# Patient Record
Sex: FEMALE | Race: WHITE | HISPANIC OR LATINO | Employment: FULL TIME | URBAN - METROPOLITAN AREA
[De-identification: names, ages, dates, MRNs, and addresses within clinical notes are randomized per-mention and may not be internally consistent; named-entity substitution may affect disease eponyms.]

---

## 2019-09-11 PROBLEM — F51.01 PRIMARY INSOMNIA: Status: ACTIVE | Noted: 2019-09-11

## 2022-08-12 ENCOUNTER — OFFICE VISIT (OUTPATIENT)
Dept: OBGYN CLINIC | Facility: CLINIC | Age: 32
End: 2022-08-12
Payer: COMMERCIAL

## 2022-08-12 VITALS
HEART RATE: 88 BPM | BODY MASS INDEX: 25.03 KG/M2 | WEIGHT: 136 LBS | HEIGHT: 62 IN | OXYGEN SATURATION: 98 % | SYSTOLIC BLOOD PRESSURE: 110 MMHG | DIASTOLIC BLOOD PRESSURE: 74 MMHG | TEMPERATURE: 99.6 F

## 2022-08-12 DIAGNOSIS — Z34.91 ENCOUNTER FOR SUPERVISION OF LOW-RISK PREGNANCY IN FIRST TRIMESTER: ICD-10-CM

## 2022-08-12 DIAGNOSIS — Z3A.01 LESS THAN 8 WEEKS GESTATION OF PREGNANCY: ICD-10-CM

## 2022-08-12 DIAGNOSIS — N91.2 AMENORRHEA: Primary | ICD-10-CM

## 2022-08-12 LAB — SL AMB POCT URINE HCG: POSITIVE

## 2022-08-12 PROCEDURE — 81025 URINE PREGNANCY TEST: CPT | Performed by: OBSTETRICS & GYNECOLOGY

## 2022-08-12 PROCEDURE — 99213 OFFICE O/P EST LOW 20 MIN: CPT | Performed by: OBSTETRICS & GYNECOLOGY

## 2022-08-12 PROCEDURE — 76817 TRANSVAGINAL US OBSTETRIC: CPT | Performed by: OBSTETRICS & GYNECOLOGY

## 2022-08-12 NOTE — PROGRESS NOTES
Subjective   Patient ID: Shannan Verma is a 36 y o  female  Patient is here for a problem visit  Chief Complaint   Patient presents with    Possible Pregnancy     New pt - positive home test - LMP 22; no concerns     New pt  Here with partner Drea Clinton   Sure LMP , 6+4 WGA   Regular cycles  Planned and desired pregnancy  Occasional mild cramping, no spotting  No N/V, some decreased appetite and breast soreness    Menstrual History:  OB History        1    Para   0    Term   0       0    AB   0    Living   0       SAB   0    IAB   0    Ectopic   0    Multiple   0    Live Births   0                  Patient's last menstrual period was 2022 (exact date)  History reviewed  No pertinent past medical history  Past Surgical History:   Procedure Laterality Date    NO PAST SURGERIES         Social History     Tobacco Use    Smoking status: Never Smoker    Smokeless tobacco: Never Used   Vaping Use    Vaping Use: Never used   Substance Use Topics    Alcohol use: Not Currently    Drug use: Never        No Known Allergies      Current Outpatient Medications:     Docosahexaenoic Acid (PRENATAL DHA PO), Take by mouth, Disp: , Rfl:     Prenatal Vit-Fe Fumarate-FA (PRENATAL VITAMINS PO), Take by mouth, Disp: , Rfl:     tacrolimus (PROTOPIC) 0 1 % ointment, Apply topically 2 (two) times a day, Disp: 100 g, Rfl: 1      Review of Systems   Constitutional: Negative for chills and fever  Eyes: Negative for visual disturbance  Respiratory: Negative for chest tightness and shortness of breath  Cardiovascular: Negative for chest pain  Gastrointestinal: Negative for abdominal pain, diarrhea, nausea and vomiting  Genitourinary: Negative for pelvic pain and vaginal bleeding  As noted in HPI   Skin: Negative for rash  Neurological: Negative for headaches  All other systems reviewed and are negative          /74 (BP Location: Left arm, Patient Position: Sitting, Cuff Size: Adult)   Pulse 88   Temp 99 6 °F (37 6 °C) (Tympanic)   Ht 5' 2" (1 575 m)   Wt 61 7 kg (136 lb)   LMP 06/27/2022 (Exact Date)   SpO2 98%   Breastfeeding No   BMI 24 87 kg/m²       Physical Exam  Constitutional:       General: She is not in acute distress  Appearance: Normal appearance  Genitourinary:      Right Labia: No rash, tenderness, lesions or skin changes  Left Labia: No tenderness, lesions, skin changes or rash  HENT:      Head: Normocephalic and atraumatic  Cardiovascular:      Rate and Rhythm: Normal rate  Pulmonary:      Effort: Pulmonary effort is normal  No respiratory distress  Abdominal:      General: There is no distension  Palpations: Abdomen is soft  Tenderness: There is no abdominal tenderness  There is no guarding or rebound  Neurological:      General: No focal deficit present  Mental Status: She is alert  Psychiatric:         Mood and Affect: Mood normal          Behavior: Behavior normal    Vitals and nursing note reviewed  US < 14 weeks     Gestational sac present  Yolk sac present  Fetal pole present  Fetal cardiac activity noted  FHR: 123 bpm     CRL: 3 6 mm, 6w0d  EDC by US 4/7/23  EDC by LMP 4/3/23      L ovary normal  R ovary normal    Ultrasound Probe Disinfection     A transvaginal ultrasound was performed  Prior to use, disinfection was performed with High Level Disinfection Process ([employer])  Probe serial number F: [id number] was used          Appropriate laboratory testing, imaging studies, and prior external records were reviewed:     Assessment/Plan:       Problem List Items Addressed This Visit        Other    Amenorrhea - Primary     SLIUP c/w stated LMP seen, OLIMPIA updated  MFM referral placed  Already taking PNV   F/u for OB intake in 3 weeks  OB precautions reviewed         Relevant Orders    POCT urine HCG (Completed)    Less than 8 weeks gestation of pregnancy      Other Visit Diagnoses Encounter for supervision of low-risk pregnancy in first trimester        Relevant Orders    Ambulatory Referral to Maternal Fetal Medicine

## 2022-08-12 NOTE — ASSESSMENT & PLAN NOTE
SLIUP c/w stated LMP seen, OLIMPIA updated  MFM referral placed  Already taking PNV   F/u for OB intake in 3 weeks  OB precautions reviewed

## 2022-08-31 ENCOUNTER — INITIAL PRENATAL (OUTPATIENT)
Dept: OBGYN CLINIC | Facility: CLINIC | Age: 32
End: 2022-08-31

## 2022-08-31 VITALS
OXYGEN SATURATION: 99 % | HEIGHT: 62 IN | DIASTOLIC BLOOD PRESSURE: 70 MMHG | TEMPERATURE: 99.2 F | WEIGHT: 136.8 LBS | HEART RATE: 87 BPM | BODY MASS INDEX: 25.17 KG/M2 | SYSTOLIC BLOOD PRESSURE: 112 MMHG

## 2022-08-31 DIAGNOSIS — Z3A.09 9 WEEKS GESTATION OF PREGNANCY: ICD-10-CM

## 2022-08-31 DIAGNOSIS — Z34.01 ENCOUNTER FOR SUPERVISION OF NORMAL FIRST PREGNANCY IN FIRST TRIMESTER: Primary | ICD-10-CM

## 2022-08-31 DIAGNOSIS — Z11.3 SCREEN FOR STD (SEXUALLY TRANSMITTED DISEASE): ICD-10-CM

## 2022-08-31 PROBLEM — Z34.00 SUPERVISION OF NORMAL FIRST PREGNANCY: Status: ACTIVE | Noted: 2022-08-31

## 2022-08-31 PROBLEM — N91.2 AMENORRHEA: Status: RESOLVED | Noted: 2022-08-12 | Resolved: 2022-08-31

## 2022-08-31 LAB
SL AMB  POCT GLUCOSE, UA: NEGATIVE
SL AMB POCT URINE PROTEIN: NEGATIVE

## 2022-08-31 PROCEDURE — PNV: Performed by: CLINICAL NURSE SPECIALIST

## 2022-08-31 PROCEDURE — 87491 CHLMYD TRACH DNA AMP PROBE: CPT | Performed by: CLINICAL NURSE SPECIALIST

## 2022-08-31 PROCEDURE — 87591 N.GONORRHOEAE DNA AMP PROB: CPT | Performed by: CLINICAL NURSE SPECIALIST

## 2022-08-31 PROCEDURE — OBC: Performed by: CLINICAL NURSE SPECIALIST

## 2022-08-31 NOTE — PROGRESS NOTES
Subjective  Patient ID: Fauzia Miles is a 36 y o  female here for OB intake  She is accompanied by: spouse    Pregnancy was planned  Reports the follow common c/o in early pregnancy: fatigue and Decreased appetite  Her Patient's last menstrual period was 2022 (exact date)  giving her an OLIMPIA of 23  She reports her menstrual cycle is regular    She has had a pregnancy confirmation appointment and previous US on 22 Demonstrated a viable IUP c/w GA by LMP    Obstetric history reviewed/updated:  OB History    Para Term  AB Living   1 0 0 0 0 0   SAB IAB Ectopic Multiple Live Births   0 0 0 0 0      # Outcome Date GA Lbr Paulie/2nd Weight Sex Delivery Anes PTL Lv   1 Current                Previous Pregnancy Complications/Hx: N/A- G1      The following portions of the patient's history were reviewed and updated as appropriate: allergies, current medications, past family history, past medical history, past social history, past surgical history, and problem list     Personal hx of thyroid disorder, DM/Pre-diabetes, PCOS, metformin use, CHTN: negative  Family hx of DM (T1 or T2), Pre-eclampsia/eclampsia, thyroid disorder:  positive for Thyroid dx and diabetes  Family genetic history completed: + FH     Pre-Pregnancy weight: Pregravid weight not on file  Pre-gravid BMI: Could not be calculated  Infection/Exposure Risk assessment  Employed: yes  Occupation: RN [employer]    Infection Screening    Prior hx of MRSA?  no  Recent COVID Infection or exposure:   no       Immunizations:   Vaccinated against Hep B: yes   Previous VZV vaccine or chicken pox disease   yes   influenza vaccine given this flu season: yes    Covid-19/SARS vaccine: yes   Discussed Tdap vaccine administration at 27-28 weeks    Substance and Domestic Abuse Screening  None             Depression screening     PHQ-A Depression Screening          ____________________________________________             LMP 2022 (Exact Date)   PE N/A-see other visit same day          Assessment/Plan:         OB intake completed  She is a  with Patient's last menstrual period was 2022 (exact date)  I reviewed risk factors for pregnancy based on her medical history     Diabetes risk Factors: The following hx was assessed r/t risk for diabetes in pregnancy: Pregestational DM, hx of GDM, BMI >35, 1st degree relative w/ T2 DM, personal hx of PCOS, Metformin use, Prior baby LGA/macrosomia  Pertinent positive:  + FH     Hypertension  The following hx was assessed r/t risk for HTN disorder in pregnancy:   (Risk level High) prior hx of CHTN, gHTN, Pre-eclampsia (or eclampsia or HELLP syndrome), FH  pre-eclampsia, Multifetal gestation, Type 1 or Type 2 DM, renal disease, Autoimmune disease, Nulliparity;   (Risk level Mod) BMI > 30, > age 28, > 10 yr pregnancy interval, Previous IUGR/SGA baby, race  Pertinent positive: Nulliparity      Additional risks/problems Identified:     Aneuploidy/Congenital defects risk factors: low  Discussed genetic screening/testing options- pt interested yes   For the low risk patient- counseled on cell free DNA vs sequential screening   SMA  no   Cystic Fibrosis Testing  no   Hemoglobin electrophoresis was given    Prenatal lab work reviewed  Reviewed routine US to be expected in pregnancy and [facility] NT/Level 2 Us/consult were ordered  Pt does not have [place] and thus Dinh Celis Was Not initiated  Pregnancy Education  [facility] Pregnancy Essentials Book reviewed and discussed  Call group and instructions to call the office/answering service in case of an emergency  Discussed appropriate weight gain in pregnancy based on pre-gravid BMI    Discussed healthy lifestyle choices for pregnancy     OB packet/Handouts given addressing   Nutrition, Immunizations, and Medications during pregnancy   Warning Signs During Pregnancy   Baby and Me phone chela guide and support center  [address] and Ultrasounds in Pregnancy    CoronaVirus and Zika precautions discussed and encouraged patient to visit [facility] website for up-to-date information  Warning signs reviewed as well as reasons to call  Problem List Items Addressed This Visit        pregnancy    Supervision of normal first pregnancy - Primary          No orders of the defined types were placed in this encounter

## 2022-08-31 NOTE — PATIENT INSTRUCTIONS
Again, congratulations on your pregnancy! NEXT STEPS  Get Prenatal bloodwork if not already done  Call [facility] to schedule next ultrasound (done 12-13 wks)   Contact information for [facility] ([facility]A Maternal Fetal Medicine)- Main Number [phone]   Return to our office in 4 weeks for routine prenatal visit (or sooner if any problems/concerns arise- see packet for things to report)  Check out [employer] website to read the "Pregnancy Essentials Guide"      It can be found by going to [employer]-->select services-->select women's health-->select Obstetrics  [email or web address]  ----------------------------------------------------  [address], [address] [facility]    Warning Signs During Pregnancy  The list below includes warning signs your providers would like you to be aware of  If you experience any of these at any time during your pregnancy, please call us as soon as possible  Vaginal bleeding   Sharp abdominal pain that does not go away   Fever (more than 100  4? F and is not relieved with Tylenol)   Persistent vomiting lasting greater than 24 hours   Chest pain/Shortness of breath   Pain or burning when you urinate    Call the OFFICE [phone] for any questions/emergencies  At night or on the weekend, calls go through a triage service, please indicate it is an emergency and the DOCTOR on call will be paged    ---------------------------------------------------------------    Discomforts of Early Pregnancy  Tips for coping with nausea and vomiting during pregnancy   Eat meals and snacks slowly   Eat every 1-2 hours to avoid a full stomach   Dont skip meals, avoid empty stomach   Eat a snack prior to getting out of bed   Avoid food and beverages with a strong aroma   Avoid dehydration - drink enough fluid to keep the urine pale yellow   Drink fluids before a meal to minimize the effect of a full stomach   Limit the amount of coffee and beverages that contain caffeine   Eliminate spicy, odorous, high fat (fried foods), acidic (tomato products) and sweet foods   Fluids that contain lemon (lemonade), mint (tea) or orange can usually be well tolerated   Snacks and meals that contain low-fat protein (lean meats, fish, poultry and eggs) along with eating easily digestible carbs (fruit, rice, toast, crackers and dry cereal) may be tolerated better   Foods with ginger may be well tolerated  May use ginger root powder, capsules or extract (up to 1000 mg per day)   Drink liquids in small amounts    If symptoms persist, please contact your provider  Tips for coping with constipation during pregnancy   Increase fiber and fluids   - Drink 8-10 cups of liquid, like water or low-sugar juice daily  - Keep urine pale with fluids (water, milk), fruit and vegetables   Eat a well-balanced diet that contains high fiber food (fruits, vegetables, whole grain breads and cereals, bran and dried beans)   Take a 30-minute walk daily   You may take a mild stool softener such as Colace®    If symptoms persist, please contact your provider  For any emergencies, PLEASE CALL THE OFFICE at [phone]  If the office is closed, the doctor on call will be paged by the answering service  Medications and Pregnancy- see handout in packetExpected Weight Gain During Pregnancy  If you have a healthy BMI (18-25) prior to pregnancy:  The recommended weight gain is between 25-35 pounds  Approximate weight gain  in the first trimester is 1-4 5 pounds  An expected weight gain during the second and  third trimester is approximately one pound per week  If you have a BMI of less than 18 prior to pregnancy,  you are considered underweight:  The recommended weight gain is between 28-40 pounds  Approximate weight gain  in the first trimester is 1-4 5 pounds   An expected weight gain during the second and  third trimester is just over one pound per week  If your BMI is 25 to 29 9: you are considered overweight:  You should gain 1/2 to 2/3 pound during the second and third trimester, for a total  weight gain of 15 to 25 pounds  If you have a BMI of greater than 30 prior to pregnancy,  you are considered overweight:  The recommended weight gain is between 15-25 pounds  Approximate weight gain  in the first trimester is 1-4 5 pounds  An expected weight gain during the second  and third trimester is approximately 0 5 pound per week  Foods to avoid during pregnancy:   Unpasteurized milk, juice and cheese  - Soft cheeses like feta or brie (if made with UNPASTEURIZED milk)   Unheated deli meats like lunchmeat and hotdogs   Undercooked poultry, beef, pork, seafood including raw sushi    What fish is safe to eat during pregnancy? Eat 8 to 12 ounces of fish a week  Pick from this group frequently, especially if you follow  the American Heart Associations recommendation to eat fish at least 2 times a week  AVOID HIGH MERCURY FISH  A single meal of the following fish can put  you over the Environmental Protection  Agencys safe limit for the month  High mercury fish:  Shark  Swordfish  [employer]  Tile Fish    Caffeine and Pregnancy  The [employer] and [employer] of Obstetrics and Gynecologists (ACOG) urge pregnant  women to limit their caffeine consumption to no more than 200 milligrams (mg) per day  This is  comparable to having one 12-ounce cup of coffee a day  This level has been shown not to increase risk  of miscarriage, growth or  labor complications  Effects of higher levels are not known  Exercise During Pregnancy  A daily exercise program that consists of 30 minutes a day is recommended     Low impact exercises like walking and swimming are great exercises throughout  all of pregnancy   If youre an avid strength  avoid lifting very heavy weights - nothing more  than 30 pounds    Drink plenty of fluids while exercising to stay hydrated  Be careful to avoid overheating  ACTIVITIES TO AVOID   Exercises that can make you lose your balance  Activities that can put your baby at risk i e  horseback riding, scuba diving, skiing  or snowboarding  Any other sport that puts you at risk for getting hit in the  abdominal area  Do not use saunas, steam rooms or hot tubs (that have a higher temperature  than 100F)   After the first trimester, avoid exercises that require you to lay flat on your back  Avoid exceeding a heart rate greater than 140 beats per minute  As long as you are  able to hold a conversation while exercising your heart rate is likely acceptable    Vaccines and Pregnancy    Information for pregnant women  Vaccines help protect you and your baby against serious diseases  Whooping Cough Vaccine  Whooping cough (or pertussis) can be serious for anyone, but for your , it can be lifethreatening  Up to 20 babies die each year in the [facility] due to whooping cough  When you get the whooping cough vaccine during your pregnancy, your body will create protective  antibodies and pass some of them to your baby before birth  These antibodies will provide your  baby some short-term, early protection against whooping cough  Learn more at www cdc gov/pertussis/pregnant/     Flu Vaccine  Changes in your immune, heart, and lung functions during pregnancy make you more likely to get  seriously ill from the flu  Catching the flu also increases your chances for serious problems for your  developing baby, including premature labor and delivery  Get the flu shot if you are pregnant during  flu season--its the best way to protect yourself and your baby for several months after birth from flu-related  complications  Flu seasons vary in their timing from season to season, but CDC recommends getting vaccinated  by the end of October, if possible   This timing helps protect you before flu activity begins to increase  Find more on how to prevent the flu by visiting www cdc gov/flu/     Covid Vaccine  Similar to the flu, Changes in your immune, heart, and lung functions during pregnancy make you more likely to get  seriously ill from COVID  It  also increases your chances for serious problems for your  developing baby, including premature labor and delivery  Please consider getting your covid vaccine if you haven't already  This is endorsed by both Delicia Arroyo of Obstetrics and Gynecology and Andrade Stevens of Maternal Fetal Medicine    Fetal Activity  You will most likely start to feel your baby move (also known as quickening) between  25 and 20 weeks of pregnancy  First time moms might feel their babys movements  closer to 25 weeks while a second time mom might feel those first movements closer  to 16 weeks

## 2022-08-31 NOTE — PROGRESS NOTES
Prenatal Visit  Subjective:   Malika Cardenas is a 36 y o  female  She is a  here for initial PN visit/New OB exam    She is reporting the following pregnancy related complaints:   No c/o other than fatigue   Denies N/V   Denies vaginal bleeding or cramping   Denies abnormal vaginal discharge or urinary c/o    Just completed OB intake today- see other visit same day  Past history review including family genetic history reveal the following risk factors:  1  Encounter for supervision of normal first pregnancy in first trimester    2  9 weeks gestation of pregnancy    3  Screen for STD (sexually transmitted disease)         Objective:  Patient's last menstrual period was 2022 (exact date)  /70 (BP Location: Left arm, Patient Position: Sitting, Cuff Size: Adult)   Pulse 87   Temp 99 2 °F (37 3 °C) (Tympanic)   Ht 5' 2" (1 575 m)   Wt 62 1 kg (136 lb 12 8 oz)   LMP 2022 (Exact Date)   SpO2 99%   BMI 25 02 kg/m²   Pregravid Weight/BMI: 61 2 kg (135 lb) (BMI 24 69)      OBGyn Exam- see prenatal physical form    FIRST TRIMESTER OBSTETRIC ULTRASOUND     Patient's last menstrual period was 2022 (exact date)  INDICATION: Follow up US     TECHNIQUE:   Transvaginal imaging was performed to assess the fetal cardiac activity     FINDINGS:  A single intrauterine gestation is identified  Gestational Sac: Present and is  normal appearing   Mean Crown-Rump Length:  20 5mm = 8w 5d  Cardiac activity is detected at 180  Adnexa:  No adnexal mass or pathologic cyst   Cul de Sac:  No significant free fluid identified     IMPRESSION:  Single intrauterine pregnancy of 8 weeks 5 days gestational age  Fetal cardiac activity detected  No adnexal masses seen  Good interval growth  No change in EDC    Ultrasound Probe Disinfection    A transvaginal ultrasound was performed  Prior to use, disinfection was performed with High Level Disinfection Process ([employer])    Probe serial number F: [id number] was used  Assessment & Plan:    1  Encounter for supervision of normal first pregnancy in first trimester  Assessment & Plan:    She is a  at [address] [facility] previously established  + FHR today by [address]  Good interval growth  New OB Exam completed  Pap is not indicated and gonorrhea/chlamydia cultures collected  See other A&P for risk factors/identified    I reviewed the expected course of the pregnancy with visits, labs and additional testing  The patient was given orders for prenatal labs today  Encouraged to complete within 2 wks  Genetic screening/testing options again reviewed and she elects for average risk NIPT     I have reviewed the maternal as well as infant benefits of breastfeeding with the patient  The patient currently plans to breastfeed, plans to bottle feed  I have reviewed proper nutrition in pregnancy as well as exercise and safe medications that can be used for various common symptoms in pregnancy  I reviewed the reasons to call in the first trimester - namely vaginal bleeding, severe nausea and vomiting, severe pelvic pain, fevers or pain/burning with urination  She was already previously referred to [facility] for NT US and will scheduled for level 2 G&A for 20-21 wks after that appt  I recommended that the patient receive the covid vaccine if not already done and to receive the flu vaccine during flu season-she usually gets for work  S/P covid vaccine x 2, plus booster  All questions were answered to her satisfaction  Orders:  -     POCT urine dip    2  9 weeks gestation of pregnancy    3  Screen for STD (sexually transmitted disease)  -     Chlamydia/GC amplified DNA by PCR;  Future  -     Chlamydia/GC amplified DNA by [address]  2022

## 2022-09-01 LAB
C TRACH DNA SPEC QL NAA+PROBE: NEGATIVE
N GONORRHOEA DNA SPEC QL NAA+PROBE: NEGATIVE

## 2022-09-01 NOTE — ASSESSMENT & PLAN NOTE
She is a  at [address] [facility] previously established  + FHR today by [address]  Good interval growth  New OB Exam completed  Pap is not indicated and gonorrhea/chlamydia cultures collected  See other A&P for risk factors/identified    I reviewed the expected course of the pregnancy with visits, labs and additional testing  The patient was given orders for prenatal labs today  Encouraged to complete within 2 wks  Genetic screening/testing options again reviewed and she elects for average risk NIPT     I have reviewed the maternal as well as infant benefits of breastfeeding with the patient  The patient currently plans to breastfeed, plans to bottle feed  I have reviewed proper nutrition in pregnancy as well as exercise and safe medications that can be used for various common symptoms in pregnancy  I reviewed the reasons to call in the first trimester - namely vaginal bleeding, severe nausea and vomiting, severe pelvic pain, fevers or pain/burning with urination  She was already previously referred to [facility] for NT US and will scheduled for level 2 G&A for 20-21 wks after that appt  I recommended that the patient receive the covid vaccine if not already done and to receive the flu vaccine during flu season-she usually gets for work  S/P covid vaccine x 2, plus booster  All questions were answered to her satisfaction

## 2022-09-24 ENCOUNTER — APPOINTMENT (OUTPATIENT)
Dept: LAB | Facility: MEDICAL CENTER | Age: 32
End: 2022-09-24
Payer: COMMERCIAL

## 2022-09-24 DIAGNOSIS — Z34.01 ENCOUNTER FOR SUPERVISION OF NORMAL FIRST PREGNANCY IN FIRST TRIMESTER: ICD-10-CM

## 2022-09-24 LAB
ABO GROUP BLD: NORMAL
BASOPHILS # BLD AUTO: 0.04 THOUSANDS/ΜL (ref 0–0.1)
BASOPHILS NFR BLD AUTO: 0 % (ref 0–1)
BILIRUB UR QL STRIP: NEGATIVE
BLD GP AB SCN SERPL QL: NEGATIVE
CLARITY UR: CLEAR
COLOR UR: COLORLESS
EOSINOPHIL # BLD AUTO: 0.04 THOUSAND/ΜL (ref 0–0.61)
EOSINOPHIL NFR BLD AUTO: 0 % (ref 0–6)
ERYTHROCYTE [DISTWIDTH] IN BLOOD BY AUTOMATED COUNT: 11.9 % (ref 11.6–15.1)
GLUCOSE UR STRIP-MCNC: NEGATIVE MG/DL
HBV SURFACE AG SER QL: NORMAL
HCT VFR BLD AUTO: 37.3 % (ref 34.8–46.1)
HCV AB SER QL: NORMAL
HGB BLD-MCNC: 12.7 G/DL (ref 11.5–15.4)
HGB UR QL STRIP.AUTO: NEGATIVE
IMM GRANULOCYTES # BLD AUTO: 0.15 THOUSAND/UL (ref 0–0.2)
IMM GRANULOCYTES NFR BLD AUTO: 2 % (ref 0–2)
KETONES UR STRIP-MCNC: NEGATIVE MG/DL
LEUKOCYTE ESTERASE UR QL STRIP: NEGATIVE
LYMPHOCYTES # BLD AUTO: 1.68 THOUSANDS/ΜL (ref 0.6–4.47)
LYMPHOCYTES NFR BLD AUTO: 17 % (ref 14–44)
MCH RBC QN AUTO: 31 PG (ref 26.8–34.3)
MCHC RBC AUTO-ENTMCNC: 34 G/DL (ref 31.4–37.4)
MCV RBC AUTO: 91 FL (ref 82–98)
MONOCYTES # BLD AUTO: 0.58 THOUSAND/ΜL (ref 0.17–1.22)
MONOCYTES NFR BLD AUTO: 6 % (ref 4–12)
NEUTROPHILS # BLD AUTO: 7.24 THOUSANDS/ΜL (ref 1.85–7.62)
NEUTS SEG NFR BLD AUTO: 75 % (ref 43–75)
NITRITE UR QL STRIP: NEGATIVE
NRBC BLD AUTO-RTO: 0 /100 WBCS
PH UR STRIP.AUTO: 6.5 [PH]
PLATELET # BLD AUTO: 219 THOUSANDS/UL (ref 149–390)
PMV BLD AUTO: 11.8 FL (ref 8.9–12.7)
PROT UR STRIP-MCNC: NEGATIVE MG/DL
RBC # BLD AUTO: 4.1 MILLION/UL (ref 3.81–5.12)
RH BLD: POSITIVE
RUBV IGG SERPL IA-ACNC: >175 IU/ML
SP GR UR STRIP.AUTO: 1.01 (ref 1–1.03)
SPECIMEN EXPIRATION DATE: NORMAL
TSH SERPL DL<=0.05 MIU/L-ACNC: 1.06 UIU/ML (ref 0.45–4.5)
UROBILINOGEN UR STRIP-ACNC: <2 MG/DL
WBC # BLD AUTO: 9.73 THOUSAND/UL (ref 4.31–10.16)

## 2022-09-24 PROCEDURE — 83020 HEMOGLOBIN ELECTROPHORESIS: CPT

## 2022-09-24 PROCEDURE — 36415 COLL VENOUS BLD VENIPUNCTURE: CPT

## 2022-09-24 PROCEDURE — 86803 HEPATITIS C AB TEST: CPT

## 2022-09-24 PROCEDURE — 84443 ASSAY THYROID STIM HORMONE: CPT

## 2022-09-24 PROCEDURE — 87086 URINE CULTURE/COLONY COUNT: CPT

## 2022-09-24 PROCEDURE — 80081 OBSTETRIC PANEL INC HIV TSTG: CPT

## 2022-09-24 PROCEDURE — 81003 URINALYSIS AUTO W/O SCOPE: CPT

## 2022-09-25 LAB
BACTERIA UR CULT: NORMAL
HIV 1+2 AB+HIV1 P24 AG SERPL QL IA: NORMAL
RPR SER QL: NORMAL

## 2022-09-26 ENCOUNTER — ROUTINE PRENATAL (OUTPATIENT)
Dept: PERINATAL CARE | Facility: OTHER | Age: 32
End: 2022-09-26
Payer: COMMERCIAL

## 2022-09-26 VITALS
DIASTOLIC BLOOD PRESSURE: 83 MMHG | HEIGHT: 62 IN | HEART RATE: 102 BPM | BODY MASS INDEX: 26.02 KG/M2 | WEIGHT: 141.4 LBS | SYSTOLIC BLOOD PRESSURE: 139 MMHG

## 2022-09-26 DIAGNOSIS — Z34.91 ENCOUNTER FOR SUPERVISION OF LOW-RISK PREGNANCY IN FIRST TRIMESTER: ICD-10-CM

## 2022-09-26 DIAGNOSIS — Z36.82 ENCOUNTER FOR (NT) NUCHAL TRANSLUCENCY SCAN: Primary | ICD-10-CM

## 2022-09-26 PROCEDURE — 36415 COLL VENOUS BLD VENIPUNCTURE: CPT | Performed by: OBSTETRICS & GYNECOLOGY

## 2022-09-26 PROCEDURE — 99242 OFF/OP CONSLTJ NEW/EST SF 20: CPT | Performed by: OBSTETRICS & GYNECOLOGY

## 2022-09-26 PROCEDURE — 76813 OB US NUCHAL MEAS 1 GEST: CPT | Performed by: OBSTETRICS & GYNECOLOGY

## 2022-09-26 NOTE — PATIENT INSTRUCTIONS
Thank you for choosing us for your  care today  If you have any questions about your ultrasound or care, please do not hesitate to contact us or your primary obstetrician  Some general instructions for your pregnancy are:    Protect against coronavirus: get vaccinated - pregnant women are increased risk of severe COVID  Notify your primary care doctor if you have any symptoms  Exercise: Aim for 22 minutes per day (150 minutes per week) of regular exercise  Walking is great! Nutrition: aim for calcium-rich and iron-rich foods as well as healthy sources of protein  Learn about Preeclampsia: preeclampsia is a common, serious high blood pressure complication in pregnancy  A blood pressure of 351YBEA (systolic or top number) or 45VWTP (diastolic or bottom number) is not normal and needs evaluation by your doctor  Aspirin is sometimes prescribed in early pregnancy to prevent preeclampsia in women with risk factors - ask your obstetrician if you should be on this medication  If you smoke, try to reduce how many cigarettes you smoke or try to quit completely  Do not vape  Other warning signs to watch out for in pregnancy or postpartum: chest pain, obstructed breathing or shortness of breath, seizures, thoughts of hurting yourself or your baby, bleeding, a painful or swollen leg, fever, or headache (see [employer]WHON[facility] POST-BIRTH Warning Signs campaign)  If these happen call 911  Itching is also not normal in pregnancy and if you experience this, especially over your hands and feet, potentially worse at night, notify your doctors

## 2022-09-26 NOTE — LETTER
September 26, 2022     Helen President, Hany Bennett [address] [facility],[address] [facility]  [address], [facility] [id number]    Patient: Jacqui Mcgee   YOB: 1990   Date of Visit: 9/26/2022       Dear Dr Randy Núñez: Thank you for referring Jacqui Mcgee to me for evaluation  Below are my notes for this consultation  If you have questions, please do not hesitate to call me  I look forward to following your patient along with you  Sincerely,        Marie Perry MD        CC: No Recipients  Marie Perry MD  9/26/2022  5:04 PM  Sign when Signing Visit  [facility] [employer] [facility]: Ms Sheri Porter was seen today for nuchal translucency ultrasound  See ultrasound report under "OB Procedures" tab  Review of Systems   Constitutional: Negative for chills, fever and unexpected weight change  HENT: Negative for congestion, dental problem, facial swelling and sore throat  Eyes: Negative for visual disturbance  Respiratory: Negative for cough and shortness of breath  Cardiovascular: Negative for chest pain and palpitations  Gastrointestinal: Negative for diarrhea and vomiting  Endocrine: Negative for polydipsia  Genitourinary: Negative for dysuria and vaginal bleeding  Musculoskeletal: Negative for back pain and joint swelling  Skin: Negative for rash and wound  Allergic/Immunologic: Negative for immunocompromised state  Neurological: Negative for seizures and headaches  Hematological: Does not bruise/bleed easily  Psychiatric/Behavioral: Negative for hallucinations and suicidal ideas  Physical Exam  Constitutional:       General: She is not in acute distress  Appearance: Normal appearance  She is not ill-appearing, toxic-appearing or diaphoretic  HENT:      Head: Normocephalic and atraumatic  Nose: No congestion or rhinorrhea  Eyes:      General: No scleral icterus  Right eye: No discharge  Left eye: No discharge        Extraocular Movements: Extraocular movements intact  Conjunctiva/sclera: Conjunctivae normal    Pulmonary:      Effort: Pulmonary effort is normal  No respiratory distress  Musculoskeletal:      Cervical back: Normal range of motion  Skin:     Coloration: Skin is not jaundiced or pale  Findings: No erythema, lesion or rash  Neurological:      General: No focal deficit present  Mental Status: She is alert and oriented to person, place, and time  Psychiatric:         Mood and Affect: Mood normal          Behavior: Behavior normal        The time spent on this new patient on the encounter date included 5 minutes previsit service time reviewing records and precharting, 10 minutes face-to-face service time counseling regarding results and coordinating care, and  5 minutes charting, totalling 20 minutes        Please don't hesitate to contact our office with any concerns or questions   -Juan Francisco Lizarraga MD

## 2022-09-26 NOTE — PROGRESS NOTES
Patient chose to have [facility] Non-invasive Prenatal Screen  Patient given brochure and is aware [facility] will contact patients insurance and coordinate coverage  Patient made aware she will need to respond to text message or e-mail from [employer] within 2 business days or testing will be run through insurance  Patient informed text message will come from area code  "415"  Provided [address] [place] [address] Street # [phone] and web site : [email or web address] [email or web address]     2 vials of blood drawn from right arm, patient tolerated blood draw without difficulty  Specimens labeled with patient identifiers (name, date of birth, specimen collection date), order and specimen was verified with patient, packed and sent via "[employer]  Copy of lab order scanned to Epic media  Maternal Fetal Medicine will have results in approximately 7-10 business days and will call patient or notify via [address]  Patient aware viewing lab result online will reveal fetal sex If ordered  Patient verbalized understanding of all instructions and no questions at this time

## 2022-09-26 NOTE — PROGRESS NOTES
[address] [employer] [address]: Ms Kira Colbert was seen today for nuchal translucency ultrasound  See ultrasound report under "OB Procedures" tab  Review of Systems   Constitutional: Negative for chills, fever and unexpected weight change  HENT: Negative for congestion, dental problem, facial swelling and sore throat  Eyes: Negative for visual disturbance  Respiratory: Negative for cough and shortness of breath  Cardiovascular: Negative for chest pain and palpitations  Gastrointestinal: Negative for diarrhea and vomiting  Endocrine: Negative for polydipsia  Genitourinary: Negative for dysuria and vaginal bleeding  Musculoskeletal: Negative for back pain and joint swelling  Skin: Negative for rash and wound  Allergic/Immunologic: Negative for immunocompromised state  Neurological: Negative for seizures and headaches  Hematological: Does not bruise/bleed easily  Psychiatric/Behavioral: Negative for hallucinations and suicidal ideas  Physical Exam  Constitutional:       General: She is not in acute distress  Appearance: Normal appearance  She is not ill-appearing, toxic-appearing or diaphoretic  HENT:      Head: Normocephalic and atraumatic  Nose: No congestion or rhinorrhea  Eyes:      General: No scleral icterus  Right eye: No discharge  Left eye: No discharge  Extraocular Movements: Extraocular movements intact  Conjunctiva/sclera: Conjunctivae normal    Pulmonary:      Effort: Pulmonary effort is normal  No respiratory distress  Musculoskeletal:      Cervical back: Normal range of motion  Skin:     Coloration: Skin is not jaundiced or pale  Findings: No erythema, lesion or rash  Neurological:      General: No focal deficit present  Mental Status: She is alert and oriented to person, place, and time     Psychiatric:         Mood and Affect: Mood normal          Behavior: Behavior normal        The time spent on this new patient on the encounter date included 5 minutes previsit service time reviewing records and precharting, 10 minutes face-to-face service time counseling regarding results and coordinating care, and  5 minutes charting, totalling 20 minutes        Please don't hesitate to contact our office with any concerns or questions   -Sami Keen MD

## 2022-09-27 ENCOUNTER — ROUTINE PRENATAL (OUTPATIENT)
Dept: OBGYN CLINIC | Facility: CLINIC | Age: 32
End: 2022-09-27

## 2022-09-27 VITALS
WEIGHT: 140 LBS | DIASTOLIC BLOOD PRESSURE: 64 MMHG | BODY MASS INDEX: 25.61 KG/M2 | SYSTOLIC BLOOD PRESSURE: 128 MMHG | HEART RATE: 92 BPM

## 2022-09-27 DIAGNOSIS — R51.9 PREGNANCY HEADACHE IN FIRST TRIMESTER: ICD-10-CM

## 2022-09-27 DIAGNOSIS — O26.891 PREGNANCY HEADACHE IN FIRST TRIMESTER: ICD-10-CM

## 2022-09-27 DIAGNOSIS — Z83.3 FAMILY HISTORY OF DIABETES MELLITUS: Primary | ICD-10-CM

## 2022-09-27 DIAGNOSIS — Z3A.13 13 WEEKS GESTATION OF PREGNANCY: ICD-10-CM

## 2022-09-27 DIAGNOSIS — Z34.01 ENCOUNTER FOR SUPERVISION OF NORMAL FIRST PREGNANCY IN FIRST TRIMESTER: ICD-10-CM

## 2022-09-27 PROBLEM — F51.01 PRIMARY INSOMNIA: Status: RESOLVED | Noted: 2019-09-11 | Resolved: 2022-09-27

## 2022-09-27 PROCEDURE — PNV: Performed by: OBSTETRICS & GYNECOLOGY

## 2022-09-27 RX ORDER — METOCLOPRAMIDE 10 MG/1
10 TABLET ORAL EVERY 6 HOURS PRN
Qty: 30 TABLET | Refills: 0 | Status: SHIPPED | OUTPATIENT
Start: 2022-09-27 | End: 2022-10-24 | Stop reason: ALTCHOICE

## 2022-09-27 NOTE — PROGRESS NOTES
S: 29 y o  Julia Irma 13w1d here for routine prenatal visit  Chief Complaint   Patient presents with    Routine Prenatal Visit     Getting headaches more frequently  Hydrating and eating enough in a day the patient states  OB complaints:  Contractions: no  Leakage: no  Bleeding: no  Fetal movement: no    More headaches in last few weeks, frontal, sometimes worse with hunger although will happen randomly, worse when feeling hot  Has taken tylenol   Denies h/o migraines prior to pregnancy       O:  /64 (BP Location: Right arm, Patient Position: Sitting, Cuff Size: Adult)   Pulse 92   Wt 63 5 kg (140 lb)   LMP 06/27/2022 (Exact Date)   BMI 25 61 kg/m²       Review of Systems   Constitutional: Negative for chills and fever  Eyes: Negative for visual disturbance  Respiratory: Negative for chest tightness and shortness of breath  Cardiovascular: Negative for chest pain  Gastrointestinal: Negative for abdominal pain, diarrhea, nausea and vomiting  Genitourinary: Negative for pelvic pain and vaginal bleeding  As noted in HPI   Skin: Negative for rash  Neurological: Positive for headaches  Negative for dizziness, seizures, syncope, weakness and numbness  All other systems reviewed and are negative  Physical Exam  Constitutional:       General: She is not in acute distress  Appearance: Normal appearance  HENT:      Head: Normocephalic and atraumatic  Cardiovascular:      Rate and Rhythm: Normal rate  Pulmonary:      Effort: Pulmonary effort is normal  No respiratory distress  Abdominal:      General: There is no distension  Palpations: Abdomen is soft  Tenderness: There is no abdominal tenderness  There is no guarding or rebound  Neurological:      General: No focal deficit present  Mental Status: She is alert  Psychiatric:         Mood and Affect: Mood normal          Behavior: Behavior normal    Vitals and nursing note reviewed  Fetal Heart Rate: 165    Pregravid Weight/BMI: 61 2 kg (135 lb) (BMI 24 69)  Current Weight: 63 5 kg (140 lb)   Total Weight Gain: 2 268 kg (5 lb)     Pre-Jostin Vitals    Flowsheet Row Most Recent Value   Prenatal Assessment    Fetal Heart Rate 165   Movement Absent   Prenatal Vitals    Blood Pressure 128/64   Weight - Scale 63 5 kg (140 lb)   Urine Albumin/Glucose    Dilation/Effacement/Station    Vaginal Drainage    Edema             Results for orders placed or performed in visit on 22   Urine culture    Specimen: Urine   Result Value Ref Range    Urine Culture No Growth <1000 cfu/mL    Hepatitis C antibody   Result Value Ref Range    Hepatitis C Ab Non-reactive Non-reactive   TSH, 3rd generation with Free T4 reflex   Result Value Ref Range    TSH 3RD GENERATON 1 060 0 450 - 4 500 uIU/mL   HIV 1/2 Antigen/Antibody (4th Generation) w Reflex [facility]   Result Value Ref Range    HIV-1/HIV-2 Ab Non-Reactive Non-Reactive   UA (URINE) with reflex to Scope   Result Value Ref Range    Color, UA Colorless     Clarity, UA Clear     Specific Gravity, UA 1 006 1 003 - 1 030    pH, UA 6 5 4 5, 5 0, 5 5, 6 0, 6 5, 7 0, 7 5, 8 0    Leukocytes, UA Negative Negative    Nitrite, UA Negative Negative    Protein, UA Negative Negative mg/dl    Glucose, UA Negative Negative mg/dl    Ketones, UA Negative Negative mg/dl    Urobilinogen, UA <2 0 <2 0 mg/dl mg/dl    Bilirubin, UA Negative Negative    Occult Blood, UA Negative Negative   Hepatitis B surface antigen   Result Value Ref Range    Hepatitis B Surface Ag Non-reactive Non-reactive, NonReactive - Confirmed   CBC and differential   Result Value Ref Range    WBC 9 73 4 31 - 10 16 Thousand/uL    RBC 4 10 3 81 - 5 12 Million/uL    Hemoglobin 12 7 11 5 - 15 4 g/dL    Hematocrit 37 3 34 8 - 46 1 %    MCV 91 82 - 98 fL    MCH 31 0 26 8 - 34 3 pg    MCHC 34 0 31 4 - 37 4 g/dL    RDW 11 9 11 6 - 15 1 %    MPV 11 8 8 9 - 12 7 fL    Platelets 580 756 - 020 Thousands/uL nRBC 0 /100 WBCs    Neutrophils Relative 75 43 - 75 %    Immat GRANS % 2 0 - 2 %    Lymphocytes Relative 17 14 - 44 %    Monocytes Relative 6 4 - 12 %    Eosinophils Relative 0 0 - 6 %    Basophils Relative 0 0 - 1 %    Neutrophils Absolute 7 24 1 85 - 7 62 Thousands/µL    Immature Grans Absolute 0 15 0 00 - 0 20 Thousand/uL    Lymphocytes Absolute 1 68 0 60 - 4 47 Thousands/µL    Monocytes Absolute 0 58 0 17 - 1 22 Thousand/µL    Eosinophils Absolute 0 04 0 00 - 0 61 Thousand/µL    Basophils Absolute 0 04 0 00 - 0 10 Thousands/µL   Rubella antibody, IgG   Result Value Ref Range    Rubella IgG Quant >175 0 >9 9 IU/mL   RPR   Result Value Ref Range    RPR Non-Reactive Non-Reactive   Type and screen   Result Value Ref Range    ABO Grouping O     Rh Factor Positive     Antibody Screen Negative     Specimen Expiration Date [id number]          Problem List        Other    13 weeks gestation of pregnancy    Supervision of normal first pregnancy    Overview     S/p COVID vaccine + booster   NIPT   - msAFP         Current Assessment & Plan     msAFP ordered, discussed timing  Reviewed normal NT US  Precautions reviewed         Family history of diabetes mellitus    Overview     Father, multiple aunts/uncles   Early glucola recommended -          Current Assessment & Plan     Discussed early diabetes screening per MFM recommendations given FH diabetes, glucola ordered         Pregnancy headache in first trimester    Current Assessment & Plan     No associated concerning neurologic sx, discussed use of tylenol, rx for reglan sent as needed                                  Helen President, MD  9/27/2022  3:27 PM

## 2022-09-27 NOTE — PATIENT INSTRUCTIONS
Lab test (AFP) for neural tube defect screening - to be done 16-18 weeks    [facility]: for up to date hours, call [phone]  [facility]/lab

## 2022-09-28 LAB
HGB A MFR BLD: 2.9 % (ref 1.8–3.2)
HGB A MFR BLD: 97.1 % (ref 96.4–98.8)
HGB F MFR BLD: 0 % (ref 0–2)
HGB FRACT BLD-IMP: NORMAL
HGB S MFR BLD: 0 %

## 2022-10-10 ENCOUNTER — TELEPHONE (OUTPATIENT)
Dept: PERINATAL CARE | Facility: OTHER | Age: 32
End: 2022-10-10

## 2022-10-10 NOTE — TELEPHONE ENCOUNTER
Spoke with patient and explained her results have been delayed and estimated report date in [employer] portal is 10/11/2022  We will call with any updates  Patient verbalized understanding

## 2022-10-24 ENCOUNTER — ROUTINE PRENATAL (OUTPATIENT)
Dept: OBGYN CLINIC | Facility: CLINIC | Age: 32
End: 2022-10-24

## 2022-10-24 VITALS
TEMPERATURE: 98.6 F | DIASTOLIC BLOOD PRESSURE: 68 MMHG | WEIGHT: 144 LBS | SYSTOLIC BLOOD PRESSURE: 118 MMHG | HEART RATE: 97 BPM | BODY MASS INDEX: 26.34 KG/M2

## 2022-10-24 DIAGNOSIS — Z34.02 ENCOUNTER FOR SUPERVISION OF NORMAL FIRST PREGNANCY IN SECOND TRIMESTER: ICD-10-CM

## 2022-10-24 DIAGNOSIS — R51.9 PREGNANCY HEADACHE IN SECOND TRIMESTER: ICD-10-CM

## 2022-10-24 DIAGNOSIS — O26.892 PREGNANCY HEADACHE IN SECOND TRIMESTER: ICD-10-CM

## 2022-10-24 DIAGNOSIS — Z83.3 FAMILY HISTORY OF DIABETES MELLITUS: ICD-10-CM

## 2022-10-24 DIAGNOSIS — Z3A.17 17 WEEKS GESTATION OF PREGNANCY: Primary | ICD-10-CM

## 2022-10-24 LAB
SL AMB  POCT GLUCOSE, UA: NEGATIVE
SL AMB POCT URINE PROTEIN: NEGATIVE

## 2022-10-24 PROCEDURE — PNV: Performed by: OBSTETRICS & GYNECOLOGY

## 2022-10-24 RX ORDER — ACETAMINOPHEN 500 MG
500 TABLET ORAL EVERY 6 HOURS PRN
COMMUNITY

## 2022-10-24 NOTE — PROGRESS NOTES
S: 29 y o  Vania Jenkins 17w0d here for routine prenatal visit  Chief Complaint   Patient presents with   • Routine Prenatal Visit     Patient reports no concerns  OB complaints:  Contractions: no  Leakage: no  Bleeding: no  Fetal movement: yes, flutters      O:  /68 (BP Location: Right arm, Cuff Size: Standard)   Pulse 97   Temp 98 6 °F (37 °C) (Tympanic)   Wt 65 3 kg (144 lb)   LMP 2022 (Exact Date)   BMI 26 34 kg/m²       Review of Systems   Constitutional: Negative for chills and fever  Eyes: Negative for visual disturbance  Respiratory: Negative for chest tightness and shortness of breath  Cardiovascular: Negative for chest pain  Gastrointestinal: Negative for abdominal pain, diarrhea, nausea and vomiting  Genitourinary: Negative for pelvic pain and vaginal bleeding  As noted in HPI   Skin: Negative for rash  Neurological: Negative for headaches  All other systems reviewed and are negative  Physical Exam  Constitutional:       General: She is not in acute distress  Appearance: Normal appearance  HENT:      Head: Normocephalic and atraumatic  Cardiovascular:      Rate and Rhythm: Normal rate  Pulmonary:      Effort: Pulmonary effort is normal  No respiratory distress  Abdominal:      General: There is no distension  Palpations: Abdomen is soft  Tenderness: There is no abdominal tenderness  There is no guarding or rebound  Comments: gravid   Neurological:      General: No focal deficit present  Mental Status: She is alert  Psychiatric:         Mood and Affect: Mood normal          Behavior: Behavior normal    Vitals and nursing note reviewed                Fetal Heart Rate: 155    Pregravid Weight/BMI: 61 2 kg (135 lb) (BMI 24 69)  Current Weight: 65 3 kg (144 lb)   Total Weight Gain: 4 082 kg (9 lb)     Pre- Vitals    Flowsheet Row Most Recent Value   Prenatal Assessment    Fetal Heart Rate 155   Movement Present Prenatal Vitals    Blood Pressure 118/68   Weight - Scale 65 3 kg (144 lb)   Urine Albumin/Glucose    Dilation/Effacement/Station    Vaginal Drainage    Edema                   Problem List        Other    17 weeks gestation of pregnancy    Encounter for supervision of normal first pregnancy in second trimester    Overview     S/p COVID vaccine + booster   NIPT low risk   - msAFP         Current Assessment & Plan     Reviewed msAFP timing which had previously been ordered  Anatomy US is scheduled   [facility]   OB precautions reviewed         Family history of diabetes mellitus    Overview     Father, multiple aunts/uncles   Early glucola recommended -          Current Assessment & Plan     Reviewed indication for early diabetes screening, glucola previously ordered         Pregnancy headache in second trimester    Current Assessment & Plan     Has made adjustments to her diet and sleep which has helped with headaches, gets infrequently now, not requiring robert Noriegala  10/24/2022  3:15 PM

## 2022-10-24 NOTE — ASSESSMENT & PLAN NOTE
Reviewed msAFP timing which had previously been ordered  Anatomy US is scheduled   She is scheduled to get flu vaccine through [facility] next week   OB precautions reviewed

## 2022-10-24 NOTE — ASSESSMENT & PLAN NOTE
Has made adjustments to her diet and sleep which has helped with headaches, gets infrequently now, not requiring reglan

## 2022-11-04 ENCOUNTER — APPOINTMENT (OUTPATIENT)
Dept: LAB | Facility: MEDICAL CENTER | Age: 32
End: 2022-11-04

## 2022-11-04 DIAGNOSIS — Z83.3 FAMILY HISTORY OF DIABETES MELLITUS: Primary | ICD-10-CM

## 2022-11-04 DIAGNOSIS — O99.810 GLUCOSE INTOLERANCE OF PREGNANCY: ICD-10-CM

## 2022-11-04 DIAGNOSIS — Z34.01 ENCOUNTER FOR SUPERVISION OF NORMAL FIRST PREGNANCY IN FIRST TRIMESTER: ICD-10-CM

## 2022-11-04 DIAGNOSIS — Z83.3 FAMILY HISTORY OF DIABETES MELLITUS: ICD-10-CM

## 2022-11-04 DIAGNOSIS — O99.810 GLUCOSE INTOLERANCE OF PREGNANCY: Primary | ICD-10-CM

## 2022-11-04 PROBLEM — O24.419 GESTATIONAL DIABETES: Status: ACTIVE | Noted: 2022-11-04

## 2022-11-04 LAB — GLUCOSE 1H P 50 G GLC PO SERPL-MCNC: 215 MG/DL (ref 40–134)

## 2022-11-04 NOTE — PROGRESS NOTES
Called and spoke with pt about results  Discussed given glucola > 200 would recommend proceeding with diabetic education and tx for presumptive early GDM  Pt is amenable to having diabetic education consult but also wants to have 3 hr GTT done   Discussed we can do this although it may not , pt voiced understanding, order placed and instructions reviewed

## 2022-11-05 LAB
2ND TRIMESTER 4 SCREEN SERPL-IMP: NORMAL
AFP ADJ MOM SERPL: 1.35
AFP INTERP AMN-IMP: NORMAL
AFP INTERP SERPL-IMP: NORMAL
AFP INTERP SERPL-IMP: NORMAL
AFP SERPL-MCNC: 69.3 NG/ML
AGE AT DELIVERY: 30.5 YR
GA METHOD: NORMAL
GA: 18.6 WEEKS
IDDM PATIENT QL: NO
MULTIPLE PREGNANCY: NO
NEURAL TUBE DEFECT RISK FETUS: 4151 %

## 2022-11-12 ENCOUNTER — APPOINTMENT (OUTPATIENT)
Dept: LAB | Facility: HOSPITAL | Age: 32
End: 2022-11-12

## 2022-11-12 DIAGNOSIS — O99.810 GLUCOSE INTOLERANCE OF PREGNANCY: ICD-10-CM

## 2022-11-12 DIAGNOSIS — Z83.3 FAMILY HISTORY OF DIABETES MELLITUS: ICD-10-CM

## 2022-11-12 LAB
EST. AVERAGE GLUCOSE BLD GHB EST-MCNC: 100 MG/DL
GLUCOSE 1H P 100 G GLC PO SERPL-MCNC: 270 MG/DL (ref 65–179)
GLUCOSE 2H P 100 G GLC PO SERPL-MCNC: 217 MG/DL (ref 65–154)
GLUCOSE 3H P 100 G GLC PO SERPL-MCNC: 90 MG/DL (ref 65–139)
GLUCOSE P FAST SERPL-MCNC: 93 MG/DL (ref 65–94)
HBA1C MFR BLD: 5.1 %

## 2022-11-14 ENCOUNTER — TELEPHONE (OUTPATIENT)
Dept: OBGYN CLINIC | Facility: CLINIC | Age: 32
End: 2022-11-14

## 2022-11-14 NOTE — TELEPHONE ENCOUNTER
The patient called and asked if you could call her as she has more questions for you  Please advise

## 2022-11-14 NOTE — TELEPHONE ENCOUNTER
Called and spoke with pt, answered questions regarding 3 hr GTT values   She already has diabetic education consult scheduled for 12/1 as this was the soonest she could do with her work schedule

## 2022-11-17 ENCOUNTER — ROUTINE PRENATAL (OUTPATIENT)
Dept: PERINATAL CARE | Facility: OTHER | Age: 32
End: 2022-11-17

## 2022-11-17 VITALS
HEIGHT: 62 IN | WEIGHT: 144.4 LBS | DIASTOLIC BLOOD PRESSURE: 64 MMHG | BODY MASS INDEX: 26.57 KG/M2 | HEART RATE: 94 BPM | SYSTOLIC BLOOD PRESSURE: 122 MMHG

## 2022-11-17 DIAGNOSIS — Z3A.20 20 WEEKS GESTATION OF PREGNANCY: ICD-10-CM

## 2022-11-17 DIAGNOSIS — O35.EXX0 PYELECTASIS OF FETUS ON PRENATAL ULTRASOUND: Primary | ICD-10-CM

## 2022-11-17 DIAGNOSIS — Z36.89 ENCOUNTER FOR FETAL ANATOMIC SURVEY: ICD-10-CM

## 2022-11-17 DIAGNOSIS — Z36.86 ENCOUNTER FOR ANTENATAL SCREENING FOR CERVICAL LENGTH: ICD-10-CM

## 2022-11-17 DIAGNOSIS — O24.410 DIET CONTROLLED GESTATIONAL DIABETES MELLITUS (GDM) IN SECOND TRIMESTER: ICD-10-CM

## 2022-11-17 NOTE — LETTER
November 17, 2022     MD Gabe Degroot [address]  [address] [facility]    Patient: Claire Ramirez   YOB: 1990   Date of Visit: 11/17/2022       Dear Dr Jamshid Hernandez: Thank you for referring Claire Ramirez to me for evaluation  Below are my notes for this consultation  If you have questions, please do not hesitate to call me  I look forward to following your patient along with you  Sincerely,        Fatmata Schwarz MD        CC: No Recipients  Fatmata Schwarz MD  11/17/2022  3:38 PM  Sign when Signing Visit  [facility]: Ms Babatunde Woods was seen today at 20w3d for anatomic survey and cervical length screening ultrasound  See ultrasound report under "OB Procedures" tab    Please don't hesitate to contact our office with any concerns or questions   -Fatmata Schwarz MD

## 2022-11-17 NOTE — PROGRESS NOTES
Ultrasound Probe Disinfection    A transvaginal ultrasound was performed  Prior to use, disinfection was performed with High Level Disinfection Process (Trophon)  Probe serial number S1: [id number] was used        Miriam Oneil  11/17/22  2:40 PM

## 2022-11-17 NOTE — PROGRESS NOTES
Jordy Montiel: Ms Mateus Deshpande was seen today at 20w3d for anatomic survey and cervical length screening ultrasound  See ultrasound report under "OB Procedures" tab    Please don't hesitate to contact our office with any concerns or questions   -Yue Cervantes MD

## 2022-11-21 PROBLEM — Z3A.21 21 WEEKS GESTATION OF PREGNANCY: Status: ACTIVE | Noted: 2022-08-12

## 2022-11-22 ENCOUNTER — ROUTINE PRENATAL (OUTPATIENT)
Dept: OBGYN CLINIC | Facility: CLINIC | Age: 32
End: 2022-11-22

## 2022-11-22 VITALS
BODY MASS INDEX: 26.52 KG/M2 | HEART RATE: 84 BPM | WEIGHT: 145 LBS | SYSTOLIC BLOOD PRESSURE: 124 MMHG | TEMPERATURE: 98.5 F | DIASTOLIC BLOOD PRESSURE: 78 MMHG

## 2022-11-22 DIAGNOSIS — O26.892 PREGNANCY HEADACHE IN SECOND TRIMESTER: ICD-10-CM

## 2022-11-22 DIAGNOSIS — Z34.92 SECOND TRIMESTER PREGNANCY: Primary | ICD-10-CM

## 2022-11-22 DIAGNOSIS — Z3A.21 21 WEEKS GESTATION OF PREGNANCY: ICD-10-CM

## 2022-11-22 DIAGNOSIS — O35.EXX0 PYELECTASIS OF FETUS ON PRENATAL ULTRASOUND: ICD-10-CM

## 2022-11-22 DIAGNOSIS — Z34.02 ENCOUNTER FOR SUPERVISION OF NORMAL FIRST PREGNANCY IN SECOND TRIMESTER: ICD-10-CM

## 2022-11-22 DIAGNOSIS — O24.410 DIET CONTROLLED GESTATIONAL DIABETES MELLITUS (GDM) IN SECOND TRIMESTER: ICD-10-CM

## 2022-11-22 DIAGNOSIS — R51.9 PREGNANCY HEADACHE IN SECOND TRIMESTER: ICD-10-CM

## 2022-11-22 LAB
SL AMB  POCT GLUCOSE, UA: NEGATIVE
SL AMB POCT URINE PROTEIN: NEGATIVE

## 2022-11-22 NOTE — PROGRESS NOTES
OB/GYN  PN Visit  Fernanda Back  [id number]  2022  5:33 PM  Dr Wille Galeazzi, MD    S: 29 y o  Voncille Press 21w1d here for PN visit  Chief Complaint   Patient presents with   • Routine Prenatal Visit     Wants to discuss further recent GDM          OB complaints:  Contractions: no  Leakage: no  Bleeding: no  Fetal movement: yes      O:  /78 (BP Location: Right arm, Cuff Size: Standard)   Pulse 84   Temp 98 5 °F (36 9 °C) (Tympanic)   Wt 65 8 kg (145 lb)   LMP 2022 (Exact Date)   BMI 26 52 kg/m²       Review of Systems   Constitutional: Negative  HENT: Negative  Eyes: Negative  Respiratory: Negative  Cardiovascular: Negative  Gastrointestinal: Negative  Endocrine: Negative  Genitourinary:        As noted in HPI   Musculoskeletal: Negative  Skin: Negative  Allergic/Immunologic: Negative  Neurological: Negative  Hematological: Negative  Psychiatric/Behavioral: Negative  Physical Exam  Constitutional:       General: She is not in acute distress  Appearance: She is well-developed and well-nourished  Abdominal:      Palpations: Abdomen is soft  Tenderness: There is no abdominal tenderness  There is no guarding  Neurological:      Mental Status: She is alert and oriented to person, place, and time  Skin:     General: Skin is warm and dry     Psychiatric:         Mood and Affect: Mood and affect normal          Behavior: Behavior normal              Pregravid Weight/BMI: 61 2 kg (135 lb) (BMI 24 69)  Current Weight: 65 8 kg (145 lb)   Total Weight Gain: 4 536 kg (10 lb)   Pre- Vitals    Flowsheet Row Most Recent Value   Prenatal Assessment    Fetal Heart Rate 159   Movement Present   Prenatal Vitals    Blood Pressure 124/78   Weight - Scale 65 8 kg (145 lb)   Urine Albumin/Glucose    Dilation/Effacement/Station    Vaginal Drainage    Edema          TW-35 lbs for pregnancy based on BMI    Problem List        Endocrine    Diet controlled gestational diabetes mellitus (GDM) in second trimester    Overview     glucola at 18 weeks elevated > 200  Diabetic education referral scheduled   Pt desires to complete 3 hr GTT regardless - abnormal, 2/4 elevated            Other    21 weeks gestation of pregnancy    Encounter for supervision of normal first pregnancy in second trimester    Overview     S/p COVID vaccine + booster   NIPT low risk   - msAFP         Family history of diabetes mellitus    Overview     Father, multiple aunts/uncles   Early glucola recommended - elevated > 200         Pregnancy headache in second trimester    Pyelectasis of fetus on prenatal ultrasound    Overview     UTDA1 bilaterally at anatomy ultrasound               Future Appointments   Date Time Provider Luis Kaba   2022  1:00 PM Veronica Monterroso, Yumi [facility]   2023  8:00 AM   [facility]     Meeting with dietician next week  Family h o diabetes  Diagnosed with GDM  Discussed GDM in pregnancy  Received flu vaccine last week  Follow-up in 4 weeks    CBC and rp AT 29 WEEKS      Es Kingston MD  2022  5:33 PM

## 2022-11-22 NOTE — PATIENT INSTRUCTIONS
Pregnancy at 23 to [address]:   Now that you are in your second trimester, you have more energy  You may also be feeling hungrier than usual  You may be gaining about ½ to 1 pound a week, and your pregnancy is beginning to show  You may need to start wearing maternity clothes  As your baby gets larger, you may have other symptoms  These may include body aches or stretch marks on your abdomen, breasts, thighs, or buttocks  DISCHARGE INSTRUCTIONS:   Return to the emergency department if:   You develop a severe headache that does not go away  You have new or increased vision changes, such as blurred or spotted vision  You have new or increased swelling in your face or hands  You have vaginal spotting or bleeding  Your water broke or you feel warm water gushing or trickling from your vagina  Call your doctor or obstetrician if:   You have abdominal cramps, pressure, or tightening  You have a change in vaginal discharge  You cannot keep food or drinks down, and you are losing weight  You have chills or a fever  You have vaginal itching, burning, or pain  You have yellow, green, white, or foul-smelling vaginal discharge  You have pain or burning when you urinate, less urine than usual, or pink or bloody urine  You have questions or concerns about your condition or care  How to care for yourself at this stage of your pregnancy:       Eat a variety of healthy foods  Healthy foods include fruits, vegetables, whole-grain breads, low-fat dairy foods, beans, lean meats, and fish  Drink liquids as directed  Ask how much liquid to drink each day and which liquids are best for you  Limit caffeine to less than 200 milligrams each day  Limit your intake of fish to 2 servings each week  Choose fish low in mercury such as canned light tuna, shrimp, salmon, cod, or tilapia  Do not  eat fish high in mercury such as swordfish, tilefish, chris mackerel, and shark           Take prenatal vitamins as directed  Your need for certain vitamins and minerals, such as folic acid, increases during pregnancy  Prenatal vitamins provide some of the extra vitamins and minerals you need  Prenatal vitamins may also help to decrease the risk of certain birth defects  Talk to your healthcare provider about exercise  Moderate exercise can help you stay fit  Your healthcare provider will help you plan an exercise program that is safe for you during pregnancy  Do not smoke  Smoking increases your risk of a miscarriage and other health problems during your pregnancy  Smoking can cause your baby to be born too early or weigh less at birth  Ask your healthcare provider for information if you need help quitting  Do not drink alcohol  Alcohol passes from your body to your baby through the placenta  It can affect your baby's brain development and cause fetal alcohol syndrome (FAS)  FAS is a group of conditions that causes mental, behavior, and growth problems  Talk to your healthcare provider before you take any medicines  Many medicines may harm your baby if you take them when you are pregnant  Do not take any medicines, vitamins, herbs, or supplements without first talking to your healthcare provider  Never use illegal or street drugs (such as marijuana or cocaine) while you are pregnant  Safety tips during pregnancy:   Avoid hot tubs and saunas  Do not use a hot tub or sauna while you are pregnant, especially during your first trimester  Hot tubs and saunas may raise your baby's temperature and increase the risk of birth defects  Avoid toxoplasmosis  This is an infection caused by eating raw meat or being around infected cat feces  It can cause birth defects, miscarriages, and other problems  Wash your hands after you touch raw meat  Make sure any meat is well-cooked before you eat it  Avoid raw eggs and unpasteurized milk   Use gloves or ask someone else to clean your cat's litter box while you are pregnant  Changes happening with your baby:  By 22 weeks, your baby is about 8 inches long from the top of the head to the rump (baby's bottom)  Your baby also weighs about 1 pound  Your baby is becoming much more active  You may be able to feel the baby move inside you now  The first movements may not be that noticeable  They may feel like a fluttering sensation  As time goes on, your baby's movements will become stronger and more noticeable  What you need to know about prenatal care:  During the first 28 weeks of your pregnancy, you will see your healthcare provider once a month  Your healthcare provider will check your blood pressure and weight  You may also need the following:  A urine test  may also be done to check for sugar and protein  These can be signs of gestational diabetes or infection  Protein in your urine may also be a sign of preeclampsia  Preeclampsia is a condition that can develop during week 20 or later of your pregnancy  It causes high blood pressure, and it can cause problems with your kidneys and other organs  Fundal height  is a measurement of your uterus to check your baby's growth  This number is usually the same as the number of weeks that you have been pregnant  A fetal ultrasound  shows pictures of your baby inside your uterus  It shows your baby's development  The movement and position of your baby can also be seen  Your healthcare provider may be able to tell you what your baby's gender is during the ultrasound  Your baby's heart rate  will be checked  Follow up with your obstetrician as directed:  Write down your questions so you remember to ask them during your visits  © [employer] 2022 Information is for End User's use only and may not be sold, redistributed or otherwise used for commercial purposes   All illustrations and images included in CareNotes® are the copyrighted property of A D A M , Inc  or [employer]  The above information is an  only  It is not intended as medical advice for individual conditions or treatments  Talk to your doctor, nurse or pharmacist before following any medical regimen to see if it is safe and effective for you

## 2022-12-01 ENCOUNTER — TELEMEDICINE (OUTPATIENT)
Dept: PERINATAL CARE | Facility: CLINIC | Age: 32
End: 2022-12-01

## 2022-12-01 ENCOUNTER — TELEPHONE (OUTPATIENT)
Dept: PERINATAL CARE | Facility: CLINIC | Age: 32
End: 2022-12-01

## 2022-12-01 DIAGNOSIS — O24.419 GESTATIONAL DIABETES MELLITUS (GDM) IN SECOND TRIMESTER, GESTATIONAL DIABETES METHOD OF CONTROL UNSPECIFIED: Primary | ICD-10-CM

## 2022-12-01 DIAGNOSIS — Z83.3 FAMILY HISTORY OF DIABETES MELLITUS: ICD-10-CM

## 2022-12-01 DIAGNOSIS — Z3A.22 22 WEEKS GESTATION OF PREGNANCY: ICD-10-CM

## 2022-12-01 DIAGNOSIS — O99.810 GLUCOSE INTOLERANCE OF PREGNANCY: ICD-10-CM

## 2022-12-01 RX ORDER — LANCETS
EACH MISCELLANEOUS
Qty: 100 EACH | Refills: 7 | Status: SHIPPED | OUTPATIENT
Start: 2022-12-01 | End: 2023-04-03

## 2022-12-01 RX ORDER — PERPHENAZINE 16 MG/1
TABLET, FILM COATED ORAL
Qty: 100 STRIP | Refills: 7 | Status: SHIPPED | OUTPATIENT
Start: 2022-12-01 | End: 2023-04-03

## 2022-12-01 RX ORDER — BLOOD-GLUCOSE METER
EACH MISCELLANEOUS
Qty: 1 KIT | Refills: 0 | Status: SHIPPED | OUTPATIENT
Start: 2022-12-01

## 2022-12-01 NOTE — PATIENT INSTRUCTIONS
CLASS 1  Diabetes in Pregnancy Program   [facility] Maternal Fetal Medicine    Diabetes Team:   Yuan Zabala (Pat) Ofelia Gill (Theresa Dela Cruz) MILLA Chase [place] Rushie Los, RN    Our medical assistant, Gissell Hidalgo can be reached at [phone] Monday thru Friday 7:45 am - 4:15 pm      We look forward to helping you manage your gestational diabetes during pregnancy  CHECKING BLOOD SUGARS    CHECKING BLOOD SUGARS:  Always carry your meter and testing supplies with you at all times  Bring you glucose meter to all your appointments in our [facility] office  **A request for a glucose meter, test strips and lancets was sent to the provider for approval  Once your prescriptions are approved by the provider, your prescription will be sent electronically to your pharmacy  Be mindful the provider is seeing patients in the office today so allow ample time for yourprescriptions to be approved  Call your pharmacy to verify your medication was received electronically and is ready for pick-up before going to pharmacy  If you have any issues with coverage or your meter is unavailable, please reach out to our office at [phone]  How to Check Blood Sugars:  Wash your hands with soap and water or use waterless  to clean your hands  Alcohol can dry your fingers and is not recommended  Alcohol can also cause false, elevated glucose readings  AVOID scented soaps and hand sanitizers - scented soaps may include sugar which can cause inaccurate/elevated readings   Gather your glucose meter kit and supplies  Prepare your meter by inserting a new test strip  This will automatically turn on your meter  Make sure test strips are not   Use a new test strip each time  Prepare your lancing device by inserting the lancet               After the lancet is securely in place, twist off the top to reveal needle     Reattach lancing device top   Once lancing device top is reattached, you are now ready to prick the side of your fingertip to get a blood sample  You can adjust the depth setting as needed  We recommend to start at "4"  Apply the blood sample to test strip according to instructions  Make sure your sharp container is: heavy duty plastic, puncture-resistant lid, upright and stable, leak resistant, properly labeled  Record your blood sugar     Additional References and Video: http://www [employer]/    Frequency: 4 Times Daily     Timing:   Fasting   Test when you wake up before you have anything to eat or drink  At least 8hrs but no more than 10hrs from your bedtime snack  Exceeding 10hrs may result in inaccurate readings  2 hrs after the first bite of your meal (breakfast, lunch, dinner) **do not test for snacks    Goals:    Fasting  60-90   1 Hour   After Meals <140   2 Hours   After Meals <120   *please inform member of diabetes team if you begin testing 1hr blood sugars      REPORTING BLOOD SUGARS:   Please only pick ONE way to report to our team  Choose the best option for you  [facility] Blood Glucose Flow sheet under "Track my Health"   Remember to click "save" for your readings to automatically upload into Epic  MyChart Message with Attachment(s)  Send a picture of a written blood sugar log   To: BRITTANY Castillo (Medical Question - Non Urgent)  You may include up to 3 images per message  Leave Voicemail at [phone]   Include: Name, Date of Birth, and Date + Blood Sugars    The diabetes team will review your blood sugar log weekly till delivery       MEAL PLAN AND DIET INSTRUCTIONS    *Carbohydrates: Sources of food that increase your blood sugar (include: starches, fruits, milk, desserts, starchy vegetables such as corn, peas, squash)    Meal Plan (3 Meals and 3 Snacks)  Outlines grams of carbohydrates to have at each meal and snack  Minimum Carbohydrate Intake Daily (avoid ketosis): 175g *to be distributed throughout day as instructed in meal plan  Include Protein at Every Meal and Snack  Eat all 3 meals and 3 snacks  Eating every 2 to 3 5 hours during the day  Preferably at the same times every day  Avoid going long periods of time without eating  Be sure to have bedtime snack that includes at least 30 grams of carbohydrates and 2 ounces (14 grams) of protein  Refer to [employer] in [employer] (pages 15-17)   Each food listed is equal to 15g (1 Carbohydrate Serving)  Read Food Label   Check Total Carbohydrate  15g = 1 Carbohydrate Serving  Check Serving Size! The total carbohydrate amount listed is based on 1 serving size  Be careful as many items contain more than one serving per package  Check Total Sugars  Choose items with <15g per serving of total sugar    Exercise:  If ok by your OB try adding a 20-30 minute walk after a meal to help keep glucose within range  Try incorporating at least 10 minutes of walking after each meal    Additional Information:   Continue to follow up with your OB and MFM providers as recommended  Always have glucose available for hypoglycemia, use 15 by 15 rule  (Page 29 in booklet)  While sick or feeling ill, follow our sick day guidelines in our GDM booklet  (Page 28 in booklet)  For travel and dining out tips refer to your GDM booklet  See attachment (Page 31 in booklet)    Class 2 Information: Approximately 1 week following Class 1  Bring 3 Day Food Log (everything you eat and drink for each meal and snack) - Will review diet more in depth and provide feedback     Remember: Importance of maintaining tight control of blood sugars during pregnancy = decrease risk factors including fetal macrosomia; birth injury; risk of ; polyhydramnios; pre-term labor; pre-eclampsia;  hypoglycemia; jaundice and stillbirth  Any questions give us a call at [phone] or send us a NearVerse message to [facility], Wyatt Holt RD  Diabetes Educator The Diabetes and Pregnancy [facility]  At [address] [place] [address]

## 2022-12-01 NOTE — TELEPHONE ENCOUNTER
Spoke with patient and confirmed her MFM appointment had to be rescheduled  Patient verbalized understanding of new time, date and location of appointment - 1/26/23  10:30  JANETT  Pt will check her schedule and will call back if not convenient

## 2022-12-01 NOTE — PROGRESS NOTES
CLASS 1 - Individual  (virtual visit)    Thank you for referring your patient to [facility] Maternal Fetal Medicine Diabetes in Pregnancy Program      Subjective:     Shagufta Mendez is a 29 y o  female who presents today unaccompanied for Virtual Regular Visit (Epic Embedded), Patient Education (Class 1; Individual), and Gestational Diabetes (22w3d)  Patient is at 22w3d gestation, Estimated Date of Delivery: 4/3/23  • Learns best by: Visual (Images/Graphics, Videos, PowerPoint Presentation) and Auditory (Lecture, Group Discussion)  • Patient rates own health as: Good  • Primary Support Person: Spouse  • Naomy with stress by: Watching TV  • How do you feel the diabetes diagnosis will affect the rest of your pregnancy? Answer: "I don't know how much it will effect me  We eat overall pretty healthy  If anything I feel that I lack eating sometimes "     Reviewed and updated the following from patients medical record: Demographics, Education, Occupation, PMH, Problem List, Allergies, and Current Medications  D&P Assessment responses have been reported throughout the following note  Visit Diagnosis:  Encounter Diagnosis     ICD-10-CM    1  Gestational diabetes mellitus (GDM) in second trimester, gestational diabetes method of control unspecified  O24 419 Mychart glucose flowsheet      2  22 weeks gestation of pregnancy  Z3A 22 Mychart glucose flowsheet      3  Family history of diabetes mellitus  Z83 3 Mychart glucose flowsheet      4  Glucose intolerance of pregnancy  O99 810 Ambulatory Referral to Maternal Fetal Medicine     Mychart glucose flowsheet           Discussed with patient:  • Pathophysiology of Gestational diabetes mellitus (GDM) in second trimester, gestational diabetes method of control unspecified [O24 419]    • Untreated hyperglycemia in pregnancy and maternal fetal complications (fetal macrosomia,  hypoglycemia, polyhydramnios, increased incidence of  section,  labor, and in severe cases fetal demise and still birth)  • Importance of blood glucose monitoring, nutrition, and medication if necessary in achieving BG goals  HPI    Diabetes Hx:   • Personal History? No - First Pregnancy  • Family History: Father     Pregnancy Overview:   OB History    Para Term  AB Living   1 0 0 0 0 0   SAB IAB Ectopic Multiple Live Births   0 0 0 0 0      # Outcome Date GA Lbr Paulie/2nd Weight Sex Delivery Anes PTL Lv   1 Current              Pregnancy Plan:   Pregnancy: Thacker  Fetal sex: Female  Support person: erica Clark     Delivery Plans  Planned delivery method: Vaginal  Planned delivery location: [facility]  Acceptable blood products:  All     Post-Delivery Plans  Feeding intentions: Breast Milk  Planned birth control: None     Labs  GDM LABS:  • 1 Hour GTT:   Lab Results   Component Value Date/Time    [id number] 215 (H) 2022 10:07 AM      • 3 Hour GTT: pt requested to complete 3hr GTT despite abnormal 1hr GTT  Lab Results   Component Value Date/Time    GLUF 93 2022 08:07 AM    UUNVLHS9WJ 270 (H) 2022 09:50 AM    ABRCXRH5ZW 217 (H) 2022 08:07 AM    ULAEXBD5DW 90 2022 11:50 AM      A1C:  Lab Results   Component Value Date/Time    HGBA1C 5 1 2022 08:07 AM    HGBA1C 5 1 2022 10:07 AM    HGBA1C 5 1 2021 12:32 PM      Labs Ordered This Visit: None    Current Outpatient Medications  Current Outpatient Medications   Medication Instructions   • acetaminophen (TYLENOL) 500 mg, Oral, Every 6 hours PRN   • Docosahexaenoic Acid (PRENATAL DHA PO) Oral   • Prenatal Vit-Fe Fumarate-FA (PRENATAL VITAMINS PO) Oral      Preferred Pharmacy:   [place] [address], [place] [facility] [address] [address]  Phone: [phone] Fax: [phone]     Anthropometrics:  Ht Readings from Last 1 Encounters:   22 5' 2" (1 575 m)      Wt Readings from Last 3 Encounters:   22 65 8 kg (145 lb)   11/17/22 65 5 kg (144 lb 6 4 oz)   10/24/22 65 3 kg (144 lb)        Pre-Gravid Wt Pre-Gravid BMI TWG   61 2 kg (135 lb) 24 69 4 536 kg (10 lb)     Total Pregnancy Weight Gain Recommendations: BMI (18 5-24 9) 25-35 lbs  • Current Wt Status Compared to Recommendations: Less Than -- Recommended to avoid additional weight loss till delivery    Most Recent Ultrasound Results:  • Findings: NML Growth/ARI per OB   o Further Fetal Surveillance: Beginning at 32 weeks (NST + ARI twice a week) = Not indicated at this time  • Next US date: Scheduled Appropriately    Blood Glucose Monitoring:     Glucose Meter: Contour Next EZ   *orders for supplies (meter, lancets, strips) based on patients needs pended/routed to appropriate provider after today's visit for review/approval     Instructed on Testing Blood Sugars: 4 x per day (Fasting, 2 hour after start of each meal)  • Goals: (Fasting) 60-90mg/dl // (1hr PP) <140mg/dl // (2hr PP) <120mg/dl  • Meter Teaching: Gave instruction on site selection, skin preparation, loading strips and lancet device, meter activation, obtaining blood sample, test strip and lancet disposal and storage, and recording log book entries  o Blood Sugar Tested in Office? No (Virtual Visit)  • Instructed to report blood sugar results weekly via Phone: [phone] OR My Chart (Message with image attachment, or Glucose Flowsheet)    Meal Plan: Patient was provided with a meal plan including 3 meals and 3 snacks  *Calories: 1800 calorie (CHO: 52-31-87-71-12-81) (PRO: 2-1-3-1-3-2)    Review of Patient's Current Diet:  • Type of Diet: Regular - reports she feels she does not enough at times  o Special or ethnic dietary preferences? no  o Food Allergies: None  • Receiving [address] or food stamps? No    Meal Plan Tips Reviewed at Today's Visit:  • Appropriate amounts of CHO, PRO, and Fat at each meal and snack     • CHO exchange list, and portion sizes for both CHO and PRO via food models  • How to read a food label  • Suggested meal/snack options to increase nutrition and maintain consistent meal and snack intakes  • Eat every 2 0-3 5 hours while awake  • Go no longer than 8-10 hours fasting overnight until first meal of the day  Physical Activity:  • Currently physically active? Yes, Walking 2-3 times a week at gym (2 miles or 45min)    Reviewed w/ Pt:   • Benefits of physical activity to optimize blood glucose control, encouraged activity at patient is physically able    o Instructed pt to always consult a physician prior to starting an exercise program    • Recommend 20-30 minutes daily  Patient Stated Goal: "I will eat 3 meals and 3 snacks each day, including protein at each"    Expected Compliance: good  • Barriers to Learning/Change: No Barriers  • Diabetes Self Management Support Plan (outside of ongoing care): Spouse/Family     Date to Report Blood Sugars: Day Before Class 2, Then Weekly (till delivery)    Class 2: Return in 11 days (on 12/12/2022) for Class 2 w/ Scarlett Paul RD    *Patient instructed to bring 3 day food diary and/or write down foods associated with elevated after meal blood sugars    Begin Time: 1:00pm    End Time: 2:18pm    It was a pleasure working with them today  Please feel free to call with any questions or concerns      Scarlett Paul RD   Diabetes Educator  [facility] Maternal Fetal Medicine  Diabetes in Pregnancy Program  [address] [place] [address] 54, 210 [facility] Regular Visit    Verification of patient location:    Patient is located in the following state in which I hold an active license [place]      Assessment/Plan:    Problem List Items Addressed This Visit     21 weeks gestation of pregnancy    Diet controlled gestational diabetes mellitus (GDM) in second trimester - Primary    Family history of diabetes mellitus    Relevant Orders    Storm[employer] glucose flowsheet   Other Visit Diagnoses     Glucose intolerance of pregnancy        Relevant Orders    Storm[employer] glucose flowsheet               Reason for visit is   Chief Complaint   Patient presents with   • Virtual Regular Visit     Epic Embedded   • Patient Education     Class 1; Individual   • Gestational Diabetes     22w3d        Encounter provider Jayda Bruce RD    Provider located at [address] [place] [id number] [phone]      Recent Visits  No visits were found meeting these conditions  Showing recent visits within past 7 days and meeting all other requirements  Today's Visits  Date Type Provider Dept   12/01/22 Telephone Padmaja Santoro, [address]    12/01/22 525 [facility], [address] [place] [address] today's visits and meeting all other requirements  Future Appointments  No visits were found meeting these conditions  Showing future appointments within next 150 days and meeting all other requirements       The patient was identified by name and date of birth  Jt Quintanilla was informed that this is a telemedicine visit and that the visit is being conducted through the [employer] [employer]  She agrees to proceed     My office door was closed  No one else was in the room  She acknowledged consent and understanding of privacy and security of the video platform  The patient has agreed to participate and understands they can discontinue the visit at any time  Patient is aware this is a billable service  Subjective  Jt Quintanilla is a 29 y o  female pregnant  HPI     No past medical history on file      Past Surgical History:   Procedure Laterality Date   • NO PAST SURGERIES         Current Outpatient Medications   Medication Sig Dispense Refill   • acetaminophen (TYLENOL) 500 mg tablet Take 500 mg by mouth every 6 (six) hours as needed for mild pain     • Docosahexaenoic Acid (PRENATAL DHA PO) Take by mouth     • Prenatal Vit-Fe Fumarate-FA (PRENATAL VITAMINS PO) Take by mouth       No current facility-administered medications for this visit  No Known Allergies    Review of Systems   Unable to perform ROS: Other       Video Exam    There were no vitals filed for this visit  Physical Exam  Constitutional:       Appearance: Normal appearance  Comments: Limited Assessment r/t Virtual Visit   Neurological:      Mental Status: She is alert            I spent 78 minutes directly with the patient during this visit

## 2022-12-12 ENCOUNTER — TELEMEDICINE (OUTPATIENT)
Dept: PERINATAL CARE | Facility: CLINIC | Age: 32
End: 2022-12-12

## 2022-12-12 DIAGNOSIS — Z83.3 FAMILY HISTORY OF DIABETES MELLITUS: ICD-10-CM

## 2022-12-12 DIAGNOSIS — R73.01 ELEVATED FASTING BLOOD SUGAR: ICD-10-CM

## 2022-12-12 DIAGNOSIS — O24.419 GESTATIONAL DIABETES MELLITUS (GDM) IN SECOND TRIMESTER, GESTATIONAL DIABETES METHOD OF CONTROL UNSPECIFIED: Primary | ICD-10-CM

## 2022-12-12 DIAGNOSIS — Z3A.24 24 WEEKS GESTATION OF PREGNANCY: ICD-10-CM

## 2022-12-12 NOTE — PROGRESS NOTES
CLASS 2 - Individual  (virtual visit)    Thank you for referring your patient to [facility]aternal [facility]etal Medicine Diabetes in Pregnancy Program      Ulises Alba is a  29 y o  female who presents today unaccompanied for Virtual Regular Visit (Epic Embedded), Patient Education (Class 2; Individual), and Gestational Diabetes (24w0d)  Patient is at 24w0d gestation, Estimated Date of Delivery: 4/3/23  Visit Diagnosis:  Encounter Diagnosis     ICD-10-CM    1  Gestational diabetes mellitus (GDM) in second trimester, gestational diabetes method of control unspecified  O24 419       2  24 weeks gestation of pregnancy  Z3A 24       3  Family history of diabetes mellitus  Z83 3     Screened early for GDM      4  Elevated fasting blood sugar  R73 01     Has been trying different bedtime snacks and walking after dinner; Prefers insulin if needed - would like to hold off on insulin for as long as possible           Reviewed and updated the following from patients medical record: PMH, Problem List, Allergies, and Current Medications      Labs  GDM LABS: See Class 1 Note    A1C:  Lab Results   Component Value Date/Time    HGBA1C 5 1 11/12/2022 08:07 AM    HGBA1C 5 1 05/14/2022 10:07 AM    HGBA1C 5 1 09/13/2021 12:32 PM      Labs Ordered This Visit: None    Current Medications:  Current Outpatient Medications   Medication Instructions   • acetaminophen (TYLENOL) 500 mg, Oral, Every 6 hours PRN   • Blood Glucose Monitoring Suppl (Contour Next EZ) w/Device KIT Test x4 Daily or as instructed   • Contour Next Test test strip Test 4 Times Daily or as instructed   • Docosahexaenoic Acid (PRENATAL DHA PO) Oral   • Microlet Lancets MISC Use 4 a Day or as instructed   • Prenatal Vit-Fe Fumarate-FA (PRENATAL VITAMINS PO) Oral        Anthropometrics:  Ht Readings from Last 1 Encounters:   11/17/22 5' 2" (1 575 m)      Wt Readings from Last 3 Encounters:   11/22/22 65 8 kg (145 lb)   11/17/22 65 5 kg (144 lb 6 4 oz)   10/24/22 65 3 kg (144 lb)        Pre-Gravid Wt Pre-Gravid BMI TWG   61 2 kg (135 lb) 24 69 4 536 kg (10 lb)     Total Pregnancy Weight Gain Recommendations: BMI (18 5-24 9) 25-35 lbs  • Current Wt Status Compared to Recommendations: Less Than -- Recommended to avoid additional weight loss till delivery    Most Recent Ultrasound Results:  • Findings: NML Growth/ARI per OB   o Further Fetal Surveillance: Beginning at 32 weeks (NST + ARI twice a week) = Not indicated at this time  • Next US date: Scheduled Appropriately    BLOOD GLUCOSE MONITORING:   Glucometer: Contour Next EZ     Reinforced at Today's Visit:   • Timing/Frequency of SMB x per day (Fasting, 2 hour after start of each meal)  • Goals: (Fasting) 60-90mg/dl // (1hr PP) <140mg/dl // (2hr PP) <120mg/dl  • Reporting Guidelines: Weekly via Phone: [phone] OR My Chart (Message with image attachment) OR Glucose Flowsheet  o Method of Reporting: [employer] Glucose Flowsheet    BG LOG:       () FB  *Had Too Good Yogurt + 3 Crackers    Review of Blood Glucose Log:   • FBG = Not well controlled   o Decreased CHO at bedtime and tried walking after dinner  • Post-Prandial BG = Well controlled   o () Had 45g of CHO at Breakfast; Realized was supposed to have 30g of CHO  o (12/10) Saltine Crackers     MEAL PLAN (Patient was provided with a meal plan including 3 meals and 3 snacks at class 1)  *Calories: 1800 calorie (CHO: 32-08-39-38-17-63) (PRO:2-1-3-1-3-2)    Review of Patient's Current Diet (24hr Recall): refer to class 1 note for additional details  · Easier to eat snacks during day when at work during the week     Breakfast: Chicken Nuggets (w/ Ketchup) + Too Good Yogurt  *Suggestions: Monitor intake of condiments which are high in sugar  AM Snack: Protein Bar   Lunch: Whole Wheat Wrap (Chicken and Cheese) + Too Good Yogurt + Fruit + Skinny Pop OR Veggie Sticks   PM Snack: Protein Bar OR Stick of Cheese w/ Crackers + Grapes  Dinner: Chipotle OR Tacos at [facility] [address] Pork Chop with Baked Potato + Mixed Salad w/ Avocado w/ Olive Oil and Lime Dressing  *Reviewed Portion Size Recommendations  Bedtime Snack: Cheese and Crackers OR  [place] Yogurt (3g of Carbohydrate) + 3 Crackers   *Discussed trying protein shake (Boost Glucose Control or Chobani Complete or Orgain) + Additional Protein Source    Beverages: No Sugar Sweetened Beverages  Dining Out Frequency: Weekly - Chipotle or Steakhouse     Meal Plan Recommendations Compliant? Consistent CHO Intake Yes   3 Meals and 3 Snacks Yes   Protein w/ Every Meal and Snack Yes   Eating every 2-3 5hrs while awake  Yes   8-10hrs Fasting (from time of bedtime snack until first meal of the day) Yes     Overall Impression: Pt has a excellent compliance and understanding of diet recommendations at this time  Reinforced Diet Instructions:  1  Individualized meal plan  2  Importance of consistent carbohydrate intake via 3 meals and 3 snacks per day   3  Importance of protein as it relates to blood glucose control  4  Encouraged  patient to eat every 2 0-3 5 hours while awake  5  Encouraged patient to go no longer than 8-10 hours fasting overnight until first meal of the day  6  Provided suggested meal/snack options to increase nutrition and maintain consistent meal and snack intakes  Physical Activity:  • Currently physically active? Yes, Walking 2-3 times a week at gym (2 miles or 45min)    Reviewed w/ Pt:   • Benefits of physical activity to optimize blood glucose control, encouraged activity at patient is physically able    o Instructed pt to always consult a physician prior to starting an exercise program    • Recommend 20-30 minutes daily      Additional Topics Reviewed:    • Medications: (reviewed options available with pt)  o Discussed if blood sugars are not within normal range with meal planning and exercise  o Reviewed medication such as metformin and/or basal/bolus insulin may be needed for better glucose control  o Prefers Insulin if needed; Would like to hold off on beginning insulin for as long as possible - will continue to try different bedtime snacks  • Maternal-Fetal Testing:   o Ultrasounds: growth scans every 4 weeks  o NST: twice weekly starting at 32nd week GA  o ARI:  weekly starting at 32 weeks GA  • Sick day Guidelines:   o Advised that sickness will raise blood sugar   o If blood sugar is > 160 mg/dL twice in one day call doctor  o If on diabetes medications, continue as instructed   o If unable to consume normal meal plan, instructed to remain well hydrated   • Hypoglycemia & Treatment Guidelines:  o Reviewed what hypoglycemia is, signs and symptoms, and how to treat via the 15:15 rule  • Post-Partum Guidelines:  o Completion of 75 gm CHO 2 hr gtt at 6 weeks post-partum to check for Type 2 DM diagnosis  • Breastfeeding Guidelines:  o Continue GDM meal plan plus additional 350-500 calories daily  - Examples of protein and carbohydrate snacks provided  o Stay hydrated by drinking 8-10 (8 oz ) fluids daily  • Dining Out & Travel Guidelines:  o Patient advised to be prepared with extra diabetes supplies, medications, and snacks, as well as sticking to the same time schedule and portions eaten at home for meals and snacks  Patient Stated Goal: "I will eat 3 meals and 3 snacks each day, including protein at each"  Goal Assessment: On track    Diabetes Self Management Support Plan outside of ongoing care: Spouse/Family    Barriers to Learning/Change: No Barriers  Expected Compliance: good    Date to report blood sugars: Weekly   Follow up:  Return per weekly review of blood sugar log  Begin Time: 8:01am  End Time: 9:08am    It was a pleasure working with them today  Please feel free to call with any questions or concerns      Cami Camarena RD   Diabetes Educator  [facility]  Diabetes in Pregnancy Program  [address] [facility] [address], [address] [facility]  Virtual Regular Visit    Verification of patient location:    Patient is located in the following state in which I hold an active license [place]      Assessment/Plan:    Problem List Items Addressed This Visit     21 weeks gestation of pregnancy    Diet controlled gestational diabetes mellitus (GDM) in second trimester - Primary    Family history of diabetes mellitus   Other Visit Diagnoses     Elevated fasting blood sugar        Has been trying different bedtime snacks and walking after dinner; Prefers insulin if needed - would like to hold off on insulin for as long as possible               Reason for visit is   Chief Complaint   Patient presents with   • Virtual Regular Visit     Epic Embedded   • Patient Education     Class 2; Individual   • Gestational Diabetes     18w0d        Encounter provider Lesly Nicole RD    Provider located at [address] [place] [id number] [phone]      Recent Visits  No visits were found meeting these conditions  Showing recent visits within past 7 days and meeting all other requirements  Today's Visits  Date Type Provider Dept   12/12/22 Telemedicine Lesly Nicole, [address] [place] [address] today's visits and meeting all other requirements  Future Appointments  No visits were found meeting these conditions  Showing future appointments within next 150 days and meeting all other requirements       The patient was identified by name and date of birth  Graciemckenna Hunt was informed that this is a telemedicine visit and that the visit is being conducted through the [employer]  She agrees to proceed     My office door was closed  No one else was in the room  She acknowledged consent and understanding of privacy and security of the video platform  The patient has agreed to participate and understands they can discontinue the visit at any time  Patient is aware this is a billable service  Subjective  Gracie Marilee is a 29 y o  female pregnant  HPI     No past medical history on file  Past Surgical History:   Procedure Laterality Date   • NO PAST SURGERIES         Current Outpatient Medications   Medication Sig Dispense Refill   • acetaminophen (TYLENOL) 500 mg tablet Take 500 mg by mouth every 6 (six) hours as needed for mild pain     • Blood Glucose Monitoring Suppl (Contour Next EZ) w/Device KIT Test x4 Daily or as instructed 1 kit 0   • Contour Next Test test strip Test 4 Times Daily or as instructed 100 strip 7   • Docosahexaenoic Acid (PRENATAL DHA PO) Take by mouth     • Microlet Lancets MISC Use 4 a Day or as instructed 100 each 7   • Prenatal Vit-Fe Fumarate-FA (PRENATAL VITAMINS PO) Take by mouth       No current facility-administered medications for this visit  No Known Allergies    Review of Systems   Unable to perform ROS: Other       Video Exam    There were no vitals filed for this visit  Physical Exam  Constitutional:       Appearance: Normal appearance  Comments: Limited Assessment r/t Virtual Visit   Neurological:      Mental Status: She is alert            I spent 67 minutes directly with the patient during this visit

## 2022-12-19 ENCOUNTER — ROUTINE PRENATAL (OUTPATIENT)
Dept: OBGYN CLINIC | Facility: CLINIC | Age: 32
End: 2022-12-19

## 2022-12-19 VITALS
HEART RATE: 93 BPM | WEIGHT: 146.8 LBS | HEIGHT: 62 IN | SYSTOLIC BLOOD PRESSURE: 120 MMHG | DIASTOLIC BLOOD PRESSURE: 68 MMHG | BODY MASS INDEX: 27.02 KG/M2 | TEMPERATURE: 98.3 F | OXYGEN SATURATION: 98 %

## 2022-12-19 DIAGNOSIS — O24.410 DIET CONTROLLED GESTATIONAL DIABETES MELLITUS (GDM) IN SECOND TRIMESTER: ICD-10-CM

## 2022-12-19 DIAGNOSIS — R51.9 PREGNANCY HEADACHE IN SECOND TRIMESTER: Primary | ICD-10-CM

## 2022-12-19 DIAGNOSIS — Z3A.21 21 WEEKS GESTATION OF PREGNANCY: ICD-10-CM

## 2022-12-19 DIAGNOSIS — Z34.02 ENCOUNTER FOR SUPERVISION OF NORMAL FIRST PREGNANCY IN SECOND TRIMESTER: ICD-10-CM

## 2022-12-19 DIAGNOSIS — O26.892 PREGNANCY HEADACHE IN SECOND TRIMESTER: Primary | ICD-10-CM

## 2022-12-19 LAB
SL AMB  POCT GLUCOSE, UA: NEGATIVE
SL AMB POCT URINE PROTEIN: NEGATIVE

## 2022-12-19 NOTE — PATIENT INSTRUCTIONS
Pregnancy at 23 to 26 [address]:   You are now close to or at the beginning of the third trimester  The third trimester starts at 24 weeks and ends with delivery  As your baby gets larger, you may develop certain symptoms  These may include pain in your back or down the sides of your abdomen  You may also have stretch marks on your abdomen, breasts, thighs, or buttocks  You may also have constipation  DISCHARGE INSTRUCTIONS:   Return to the emergency department if:   You develop a severe headache that does not go away  You have new or increased vision changes, such as blurred or spotted vision  You have new or increased swelling in your face or hands  You have vaginal spotting or bleeding  Your water broke or you feel warm water gushing or trickling from your vagina  Call your doctor or obstetrician if:   You have abdominal cramps, pressure, or tightening  You have a change in vaginal discharge  You have light bleeding  You have chills or a fever  You have vaginal itching, burning, or pain  You have yellow, green, white, or foul-smelling vaginal discharge  You have pain or burning when you urinate, less urine than usual, or pink or bloody urine  You have questions or concerns about your condition or care  How to care for yourself at this stage of your pregnancy:       Eat a variety of healthy foods  Healthy foods include fruits, vegetables, whole-grain breads, low-fat dairy foods, beans, lean meats, and fish  Drink liquids as directed  Ask how much liquid to drink each day and which liquids are best for you  Limit caffeine to less than 200 milligrams each day  Limit your intake of fish to 2 servings each week  Choose fish low in mercury such as canned light tuna, shrimp, salmon, cod, or tilapia  Do not  eat fish high in mercury such as swordfish, tilefish, chris mackerel, and shark  Manage back pain    Do not stand for long periods of time or lift heavy items  Use good posture while you stand, squat, or bend  Wear low-heeled shoes with good support  Rest may also help to relieve back pain  Ask your healthcare provider about exercises you can do to strengthen your back muscles  Take prenatal vitamins as directed  Your need for certain vitamins and minerals, such as folic acid, increases during pregnancy  Prenatal vitamins provide some of the extra vitamins and minerals you need  Prenatal vitamins may also help to decrease the risk of certain birth defects  Talk to your healthcare provider about exercise  Moderate exercise can help you stay fit  Your healthcare provider will help you plan an exercise program that is safe for you during pregnancy  Do not smoke  Smoking increases your risk of a miscarriage and other health problems during your pregnancy  Smoking can cause your baby to be born too early or weigh less at birth  Ask your healthcare provider for information if you need help quitting  Do not drink alcohol  Alcohol passes from your body to your baby through the placenta  It can affect your baby's brain development and cause fetal alcohol syndrome (FAS)  FAS is a group of conditions that causes mental, behavior, and growth problems  Talk to your healthcare provider before you take any medicines  Many medicines may harm your baby if you take them when you are pregnant  Do not take any medicines, vitamins, herbs, or supplements without first talking to your healthcare provider  Never use illegal or street drugs (such as marijuana or cocaine) while you are pregnant  Safety tips:   Avoid hot tubs and saunas  Do not use a hot tub or sauna while you are pregnant, especially during your first trimester  Hot tubs and saunas may raise your baby's temperature and increase the risk of birth defects  Avoid toxoplasmosis  This is an infection caused by eating raw meat or being around infected cat feces   It can cause birth defects, miscarriages, and other problems  Wash your hands after you touch raw meat  Make sure any meat is well-cooked before you eat it  Avoid raw eggs and unpasteurized milk  Use gloves or ask someone else to clean your cat's litter box while you are pregnant  Changes that are happening with your baby:  By 26 weeks, your baby will weigh about 2 pounds  Your baby will be about 10 inches long from the top of the head to the rump (baby's bottom)  Your baby's movements are much stronger now  Your baby's eyes are almost completely formed and can partially open  Your baby also sleeps and wakes up  What you need to know about prenatal care: Your healthcare provider will check your blood pressure and weight  You may also need the following:  A urine test  may also be done to check for sugar and protein  These can be signs of gestational diabetes or infection  Protein in your urine may also be a sign of preeclampsia  Preeclampsia is a condition that can develop during week 20 or later of your pregnancy  It causes high blood pressure, and it can cause problems with your kidneys and other organs  A gestational diabetes screen  may be done  Your healthcare provider may order either a 1-step or 2-step oral glucose tolerance test (OGTT)  1-step OGTT:  Your blood sugar level will be tested after you have not eaten for 8 hours (fasting)  You will then be given a glucose drink  Your level will be tested again 1 hour and 2 hours after you finish the drink  2-step OGTT:  You do not have to fast for the first part of the test  You will have the glucose drink at any time of day  Your blood sugar level will be checked 1 hour later  If your blood sugar is higher than a certain level, another test will be ordered  You will fast and your blood sugar level will be tested  You will have the glucose drink  Your blood will be tested again 1 hour, 2 hours, and 3 hours after you finish the glucose drink      Fundal height  is a measurement of your uterus to check your baby's growth  This number is usually the same as the number of weeks that you have been pregnant  Your baby's heart rate  will be checked  Follow up with your doctor or obstetrician as directed:  Write down your questions so you remember to ask them during your visits  © [employer] 2022 Information is for End User's use only and may not be sold, redistributed or otherwise used for commercial purposes  All illustrations and images included in CareNotes® are the copyrighted property of [employer]  or [facility] Barb Spann   The above information is an  only  It is not intended as medical advice for individual conditions or treatments  Talk to your doctor, nurse or pharmacist before following any medical regimen to see if it is safe and effective for you

## 2022-12-19 NOTE — PROGRESS NOTES
OB/GYN  PN Visit  Matilde Capps  [id number]  2022  4:02 PM  Dr Juliann Irvin MD    S: 29 y o  Francie Kent 25w0d here for PN visit  Chief Complaint   Patient presents with   • Routine Prenatal Visit     Pt c/o numbness/stiffness in hands at night     Numbness/tingling of both hands at night      OB complaints:  Contractions: no  Leakage: no  Bleeding: no  Fetal movement: yes      O:  /68 (BP Location: Right arm, Patient Position: Sitting, Cuff Size: Adult)   Pulse 93   Temp 98 3 °F (36 8 °C) (Tympanic)   Ht 5' 2" (1 575 m)   Wt 66 6 kg (146 lb 12 8 oz)   LMP 2022 (Exact Date)   SpO2 98%   BMI 26 85 kg/m²       Review of Systems   Constitutional: Negative  HENT: Negative  Eyes: Negative  Respiratory: Negative  Cardiovascular: Negative  Gastrointestinal: Negative  Endocrine: Negative  Genitourinary:        As noted in HPI   Musculoskeletal: Negative  Skin: Negative  Allergic/Immunologic: Negative  Neurological: Negative  Hematological: Negative  Psychiatric/Behavioral: Negative  Physical Exam  Constitutional:       General: She is not in acute distress  Appearance: She is well-developed  Abdominal:      Palpations: Abdomen is soft  Tenderness: There is no abdominal tenderness  There is no guarding  Neurological:      Mental Status: She is alert and oriented to person, place, and time  Skin:     General: Skin is warm and dry     Psychiatric:         Behavior: Behavior normal              Pregravid Weight/BMI: 61 2 kg (135 lb) (BMI 24 69)  Current Weight: 66 6 kg (146 lb 12 8 oz)   Total Weight Gain: 5 352 kg (11 lb 12 8 oz)   Pre-Jostin Vitals    Flowsheet Row Most Recent Value   Prenatal Assessment    Fetal Heart Rate 158   Fundal Height (cm) 23 cm   Movement Present   Prenatal Vitals    Blood Pressure 120/68   Weight - Scale 66 6 kg (146 lb 12 8 oz)   Urine Albumin/Glucose    Dilation/Effacement/Station    Vaginal Drainage    Edema Problem List        Endocrine    Diet controlled gestational diabetes mellitus (GDM) in second trimester    Overview     glucola at 18 weeks elevated > 200  Diabetic education referral scheduled   Pt desires to complete 3 hr GTT regardless - abnormal, 2/4 elevated            Other    21 weeks gestation of pregnancy    Overview     FLU VACCINE RECEIVED         Encounter for supervision of normal first pregnancy in second trimester    Overview     S/p COVID vaccine + booster   NIPT low risk   - msAFP         Family history of diabetes mellitus    Overview     Father, multiple aunts/uncles   Early glucola recommended - elevated > 200         Pregnancy headache in second trimester    Pyelectasis of fetus on prenatal ultrasound    Overview     UTDA1 bilaterally at anatomy ultrasound           Fetal echo is planned  Advised wrist splints, possible PT if worse  Diet controlled GDM - continue to maintain contact with GDM    Future Appointments   Date Time Provider Luis Kaba   2023  9:00 AM   [place]   2023 11:30 AM  US [address] JANETT [facility] [address]               Harrison Malin MD  2022  4:02 PM

## 2022-12-27 ENCOUNTER — DOCUMENTATION (OUTPATIENT)
Dept: PERINATAL CARE | Facility: CLINIC | Age: 32
End: 2022-12-27

## 2022-12-27 NOTE — PROGRESS NOTES
Date: 12/27/22  Lesia Clarke  1990  Estimated Date of Delivery: 4/3/23  26w1d  OB/GYN:Geraldo    Diagnosis:  Diet controlled GDM    Blood Sugar Logs Submitted via: Glucose Flowsheet        Reports a pip broke in her home & has not been able to test her BG  Advised to begin testing as son as possible  Assessment and Plan:  No diabetes related medications Prefers insulin if needs medication  1800 calorie (CHO: 88-57-28-64-36-11) (PRO:2-1-3-1-3-2) meal plan consisting of 3 meals and 3 snacks daily including protein at each  Advised patient to Change bedtime snack to 1 CHO serviong (15 gm) & 2-3 oz protien  Examples were provided  Avoid fasting for > 10 hours overnight  Continue SMBG 4 x per day (Fasting, 2 hour after start of each meal)  Walks & goes to gym 2-3 times weekly  Lab Work:   Lab Results   Component Value Date    HGBA1C 5 1 11/12/2022      Ultrasound:       Date:11/17/22       Fetal Growth: Normal       ARI: Normal   Next: 1/4/23    Diabetes Self Management Support Plan outside of ongoing care: Spouse/Family   Patient Stated Goal: "I will eat 3 meals and 3 snacks each day, including protein at each"   Goal Assessment:  On track    Date to report next: Weekly     Bela Khalil MS, RD, CDE  Diabetes Educator  [facility] Maternal Fetal Medicine  Diabetes in Pregnancy Program  [address] [place] [address],[address]  [address]

## 2023-01-04 ENCOUNTER — ROUTINE PRENATAL (OUTPATIENT)
Dept: PERINATAL CARE | Facility: OTHER | Age: 33
End: 2023-01-04

## 2023-01-04 VITALS
WEIGHT: 147 LBS | HEIGHT: 62 IN | DIASTOLIC BLOOD PRESSURE: 60 MMHG | BODY MASS INDEX: 27.05 KG/M2 | HEART RATE: 100 BPM | SYSTOLIC BLOOD PRESSURE: 124 MMHG

## 2023-01-04 DIAGNOSIS — O24.410 DIET CONTROLLED GESTATIONAL DIABETES MELLITUS (GDM) IN SECOND TRIMESTER: ICD-10-CM

## 2023-01-04 DIAGNOSIS — Z3A.27 27 WEEKS GESTATION OF PREGNANCY: Primary | ICD-10-CM

## 2023-01-04 DIAGNOSIS — O35.EXX0 ENCOUNTER FOR REPEAT ULTRASOUND OF FETAL PYELECTASIS IN SINGLETON PREGNANCY, ANTEPARTUM: ICD-10-CM

## 2023-01-04 NOTE — LETTER
January 4, 2023     Mita Ordoñez, [address] [place], [place] [address] [address] [facility]    Patient: Sasha Burch   YOB: 1990   Date of Visit: 1/4/2023       Dear Dr Wylie Samuel: Thank you for referring Sasha Burch to me for evaluation  Below are my notes for this consultation  If you have questions, please do not hesitate to call me  I look forward to following your patient along with you           Sincerely,        Irasema Prieto MD        CC: No Recipients  Irasema Prieto MD  1/4/2023 10:27 AM  Sign when Signing Visit  Please refer to the [facility] ultrasound report in Ob Procedures for additional information regarding today's visit

## 2023-01-04 NOTE — PROGRESS NOTES
Please refer to the [facility] ultrasound report in Ob Procedures for additional information regarding today's visit

## 2023-01-05 DIAGNOSIS — O24.419 GESTATIONAL DIABETES MELLITUS (GDM) IN SECOND TRIMESTER, GESTATIONAL DIABETES METHOD OF CONTROL UNSPECIFIED: Primary | ICD-10-CM

## 2023-01-05 DIAGNOSIS — Z3A.27 27 WEEKS GESTATION OF PREGNANCY: ICD-10-CM

## 2023-01-06 ENCOUNTER — LAB (OUTPATIENT)
Dept: LAB | Facility: CLINIC | Age: 33
End: 2023-01-06

## 2023-01-06 DIAGNOSIS — Z3A.21 21 WEEKS GESTATION OF PREGNANCY: ICD-10-CM

## 2023-01-06 LAB
ALBUMIN SERPL BCP-MCNC: 2.9 G/DL (ref 3.5–5)
ALP SERPL-CCNC: 127 U/L (ref 46–116)
ALT SERPL W P-5'-P-CCNC: 24 U/L (ref 12–78)
ANION GAP SERPL CALCULATED.3IONS-SCNC: 7 MMOL/L (ref 4–13)
AST SERPL W P-5'-P-CCNC: 14 U/L (ref 5–45)
BILIRUB SERPL-MCNC: 0.27 MG/DL (ref 0.2–1)
BUN SERPL-MCNC: 11 MG/DL (ref 5–25)
CALCIUM ALBUM COR SERPL-MCNC: 10.5 MG/DL (ref 8.3–10.1)
CALCIUM SERPL-MCNC: 9.6 MG/DL (ref 8.3–10.1)
CHLORIDE SERPL-SCNC: 107 MMOL/L (ref 96–108)
CO2 SERPL-SCNC: 22 MMOL/L (ref 21–32)
CREAT SERPL-MCNC: 0.43 MG/DL (ref 0.6–1.3)
ERYTHROCYTE [DISTWIDTH] IN BLOOD BY AUTOMATED COUNT: 12.2 % (ref 11.6–15.1)
EST. AVERAGE GLUCOSE BLD GHB EST-MCNC: 85 MG/DL
GFR SERPL CREATININE-BSD FRML MDRD: 136 ML/MIN/1.73SQ M
GLUCOSE SERPL-MCNC: 79 MG/DL (ref 65–140)
HBA1C MFR BLD: 4.6 %
HCT VFR BLD AUTO: 37.1 % (ref 34.8–46.1)
HGB BLD-MCNC: 12.2 G/DL (ref 11.5–15.4)
MCH RBC QN AUTO: 29.7 PG (ref 26.8–34.3)
MCHC RBC AUTO-ENTMCNC: 32.9 G/DL (ref 31.4–37.4)
MCV RBC AUTO: 90 FL (ref 82–98)
PLATELET # BLD AUTO: 198 THOUSANDS/UL (ref 149–390)
PMV BLD AUTO: 11.5 FL (ref 8.9–12.7)
POTASSIUM SERPL-SCNC: 3.8 MMOL/L (ref 3.5–5.3)
PROT SERPL-MCNC: 6.9 G/DL (ref 6.4–8.4)
RBC # BLD AUTO: 4.11 MILLION/UL (ref 3.81–5.12)
SODIUM SERPL-SCNC: 136 MMOL/L (ref 135–147)
WBC # BLD AUTO: 10.23 THOUSAND/UL (ref 4.31–10.16)

## 2023-01-08 LAB — RPR SER QL: NORMAL

## 2023-01-12 ENCOUNTER — ROUTINE PRENATAL (OUTPATIENT)
Dept: OBGYN CLINIC | Facility: CLINIC | Age: 33
End: 2023-01-12

## 2023-01-12 ENCOUNTER — ULTRASOUND (OUTPATIENT)
Dept: PERINATAL CARE | Facility: OTHER | Age: 33
End: 2023-01-12

## 2023-01-12 VITALS
DIASTOLIC BLOOD PRESSURE: 70 MMHG | WEIGHT: 148 LBS | HEART RATE: 90 BPM | BODY MASS INDEX: 27.23 KG/M2 | OXYGEN SATURATION: 97 % | TEMPERATURE: 98.6 F | HEIGHT: 62 IN | SYSTOLIC BLOOD PRESSURE: 126 MMHG

## 2023-01-12 VITALS
HEART RATE: 99 BPM | BODY MASS INDEX: 26.98 KG/M2 | DIASTOLIC BLOOD PRESSURE: 68 MMHG | HEIGHT: 62 IN | SYSTOLIC BLOOD PRESSURE: 118 MMHG | WEIGHT: 146.6 LBS

## 2023-01-12 DIAGNOSIS — O35.EXX0 PYELECTASIS OF FETUS ON PRENATAL ULTRASOUND: Primary | ICD-10-CM

## 2023-01-12 DIAGNOSIS — O26.892 PREGNANCY HEADACHE IN SECOND TRIMESTER: Primary | ICD-10-CM

## 2023-01-12 DIAGNOSIS — Z3A.28 28 WEEKS GESTATION OF PREGNANCY: ICD-10-CM

## 2023-01-12 DIAGNOSIS — R51.9 PREGNANCY HEADACHE IN SECOND TRIMESTER: Primary | ICD-10-CM

## 2023-01-12 DIAGNOSIS — Z34.02 ENCOUNTER FOR SUPERVISION OF NORMAL FIRST PREGNANCY IN SECOND TRIMESTER: ICD-10-CM

## 2023-01-12 DIAGNOSIS — Z23 NEED FOR TDAP VACCINATION: ICD-10-CM

## 2023-01-12 DIAGNOSIS — Z36.89 ENCOUNTER FOR ULTRASOUND TO ASSESS FETAL GROWTH: ICD-10-CM

## 2023-01-12 DIAGNOSIS — O24.410 DIET CONTROLLED GESTATIONAL DIABETES MELLITUS (GDM) IN SECOND TRIMESTER: ICD-10-CM

## 2023-01-12 LAB
DME PARACHUTE DELIVERY DATE REQUESTED: NORMAL
DME PARACHUTE ITEM DESCRIPTION: NORMAL
DME PARACHUTE ORDER STATUS: NORMAL
DME PARACHUTE SUPPLIER NAME: NORMAL
DME PARACHUTE SUPPLIER PHONE: NORMAL
SL AMB  POCT GLUCOSE, UA: NEGATIVE
SL AMB POCT URINE PROTEIN: NEGATIVE

## 2023-01-12 NOTE — ASSESSMENT & PLAN NOTE
Given consents to review, sign next visit  Breast pump sent through [facility] D[facility]   OB precautions reviewed

## 2023-01-12 NOTE — PATIENT INSTRUCTIONS
Thank you for choosing us for your  care today  If you have any questions about your ultrasound or care, please do not hesitate to contact us or your primary obstetrician  Some general instructions for your pregnancy are:    Protect against coronavirus: get vaccinated - pregnant women are increased risk of severe COVID  Notify your primary care doctor if you have any symptoms  Exercise: Aim for 22 minutes per day (150 minutes per week) of regular exercise  Walking is great! Nutrition: aim for calcium-rich and iron-rich foods as well as healthy sources of protein  Learn about Preeclampsia: preeclampsia is a common, serious high blood pressure complication in pregnancy  A blood pressure of 625GYLD (systolic or top number) or 74OFMX (diastolic or bottom number) is not normal and needs evaluation by your doctor  Aspirin is sometimes prescribed in early pregnancy to prevent preeclampsia in women with risk factors - ask your obstetrician if you should be on this medication  If you smoke, try to reduce how many cigarettes you smoke or try to quit completely  Do not vape  Other warning signs to watch out for in pregnancy or postpartum: chest pain, obstructed breathing or shortness of breath, seizures, thoughts of hurting yourself or your baby, bleeding, a painful or swollen leg, fever, or headache (see [facility] POST-BIRTH Warning Signs campaign)  If these happen call 911  Itching is also not normal in pregnancy and if you experience this, especially over your hands and feet, potentially worse at night, notify your doctors

## 2023-01-12 NOTE — PROGRESS NOTES
S: 29 y o  Myke Pump 28w3d here for routine prenatal visit  Chief Complaint   Patient presents with   • Routine Prenatal Visit     No concerns; pt due for TDAP, wants to discuss         OB complaints:  Contractions: few occasional, not painful   Leakage: no  Bleeding: no  Fetal movement: yes      O:  /70 (BP Location: Right arm, Patient Position: Sitting, Cuff Size: Adult)   Pulse 90   Temp 98 6 °F (37 °C) (Tympanic)   Ht 5' 2" (1 575 m)   Wt 67 1 kg (148 lb)   LMP 2022 (Exact Date)   SpO2 97%   BMI 27 07 kg/m²       Review of Systems   Constitutional: Negative for chills and fever  Eyes: Negative for visual disturbance  Respiratory: Negative for chest tightness and shortness of breath  Cardiovascular: Negative for chest pain  Gastrointestinal: Negative for abdominal pain, diarrhea, nausea and vomiting  Genitourinary: Negative for pelvic pain and vaginal bleeding  As noted in HPI   Skin: Negative for rash  Neurological: Negative for headaches  All other systems reviewed and are negative  Physical Exam  Constitutional:       General: She is not in acute distress  Appearance: Normal appearance  HENT:      Head: Normocephalic and atraumatic  Cardiovascular:      Rate and Rhythm: Normal rate  Pulmonary:      Effort: Pulmonary effort is normal  No respiratory distress  Abdominal:      General: There is no distension  Palpations: Abdomen is soft  Tenderness: There is no abdominal tenderness  There is no guarding or rebound  Comments: gravid   Neurological:      General: No focal deficit present  Mental Status: She is alert  Psychiatric:         Mood and Affect: Mood normal          Behavior: Behavior normal    Vitals and nursing note reviewed                     Pregravid Weight/BMI: 61 2 kg (135 lb) (BMI 24 69)  Current Weight: 67 1 kg (148 lb)   Total Weight Gain: 5 897 kg (13 lb)     Pre-Jostin Vitals    Flowsheet Row Most Recent Value   Prenatal Assessment    Prenatal Vitals    Blood Pressure 126/70   Weight - Scale 67 1 kg (148 lb)   Urine Albumin/Glucose    Dilation/Effacement/Station    Vaginal Drainage    Edema             Results for orders placed or performed in visit on 01/12/23   POCT urine dip   Result Value Ref Range    POCT URINE PROTEIN Negative     GLUCOSE, UA Negative          Problem List        Endocrine    Diet controlled gestational diabetes mellitus (GDM) in second trimester    Overview     glucola at 18 weeks elevated > 200  Diabetic education referral scheduled   Pt desires to complete 3 hr GTT regardless - abnormal, 2/4 elevated  Fetal echo WNL          Current Assessment & Plan     Has growth US scheduled today   Fasting numbers have improved            Other    28 weeks gestation of pregnancy    Encounter for supervision of normal first pregnancy in second trimester    Overview     S/p COVID vaccine + booster   S/p flu vaccine  NIPT, msAFP low risk          Current Assessment & Plan     Given consents to review, sign next visit  Breast pump sent through [facility]   OB precautions reviewed         Family history of diabetes mellitus    Overview     Father, multiple aunts/uncles   Early glucola recommended - elevated > 200         Pyelectasis of fetus on prenatal ultrasound    Overview     UTDA1 bilaterally at anatomy ultrasound        Other Visit Diagnoses     Need for Tdap vaccination                                Ritika Alston MD  1/12/2023  11:12 AM

## 2023-01-12 NOTE — PROGRESS NOTES
[address]: Ms Bhavik Park was seen today for fetal growth and followup missed anatomy ultrasound  See ultrasound report under "OB Procedures" tab  The time spent on this established patient on the encounter date included 4 minutes previsit service time reviewing records and precharting, 5 minutes face-to-face service time counseling regarding results and coordinating care, and  4 minutes charting, totalling 13 minutes    Please don't hesitate to contact our office with any concerns or questions   -Sharron Albarran MD

## 2023-01-24 ENCOUNTER — ROUTINE PRENATAL (OUTPATIENT)
Dept: OBGYN CLINIC | Facility: CLINIC | Age: 33
End: 2023-01-24

## 2023-01-24 VITALS
HEIGHT: 62 IN | WEIGHT: 148 LBS | SYSTOLIC BLOOD PRESSURE: 112 MMHG | DIASTOLIC BLOOD PRESSURE: 56 MMHG | HEART RATE: 98 BPM | BODY MASS INDEX: 27.23 KG/M2

## 2023-01-24 DIAGNOSIS — O24.410 DIET CONTROLLED GESTATIONAL DIABETES MELLITUS (GDM) IN THIRD TRIMESTER: ICD-10-CM

## 2023-01-24 DIAGNOSIS — Z34.03 ENCOUNTER FOR SUPERVISION OF NORMAL FIRST PREGNANCY IN THIRD TRIMESTER: ICD-10-CM

## 2023-01-24 DIAGNOSIS — Z3A.30 30 WEEKS GESTATION OF PREGNANCY: Primary | ICD-10-CM

## 2023-01-24 LAB
SL AMB  POCT GLUCOSE, UA: NEGATIVE
SL AMB POCT URINE PROTEIN: NEGATIVE

## 2023-01-24 NOTE — PATIENT INSTRUCTIONS
Pregnancy at 27 to 30 [address]:   You may notice new symptoms such as shortness of breath, heartburn, or swelling of your ankles and feet  You may also have trouble sleeping or contractions  DISCHARGE INSTRUCTIONS:   Return to the emergency department if:   You develop a severe headache that does not go away  You have new or increased vision changes, such as blurred or spotted vision  You have new or increased swelling in your face or hands  You have vaginal spotting or bleeding  Your water broke or you feel warm water gushing or trickling from your vagina  Call your doctor or obstetrician if:   You have more than 5 contractions in 1 hour  You notice any changes in your baby's movements  You have abdominal cramps, pressure, or tightening  You have a change in vaginal discharge  You have chills or a fever  You have vaginal itching, burning, or pain  You have yellow, green, white, or foul-smelling vaginal discharge  You have pain or burning when you urinate, less urine than usual, or pink or bloody urine  You have questions or concerns about your condition or care  How to care for yourself at this stage of your pregnancy:       Eat a variety of healthy foods  Healthy foods include fruits, vegetables, whole-grain breads, low-fat dairy foods, beans, lean meats, and fish  Drink liquids as directed  Ask how much liquid to drink each day and which liquids are best for you  Limit caffeine to less than 200 milligrams each day  Limit your intake of fish to 2 servings each week  Choose fish low in mercury such as canned light tuna, shrimp, salmon, cod, or tilapia  Do not  eat fish high in mercury such as swordfish, tilefish, chris mackerel, and shark  Manage heartburn  by eating 4 or 5 small meals each day instead of large meals  Avoid spicy food  Manage swelling  by lying down and putting your feet up  Take prenatal vitamins as directed  Your need for certain vitamins and minerals, such as folic acid, increases during pregnancy  Prenatal vitamins provide some of the extra vitamins and minerals you need  Prenatal vitamins may also help to decrease the risk of certain birth defects  Talk to your healthcare provider about exercise  Moderate exercise can help you stay fit  Your healthcare provider will help you plan an exercise program that is safe for you during pregnancy  Do not smoke  Smoking increases your risk of a miscarriage and other health problems during your pregnancy  Smoking can cause your baby to be born too early or weigh less at birth  Ask your healthcare provider for information if you need help quitting  Do not drink alcohol  Alcohol passes from your body to your baby through the placenta  It can affect your baby's brain development and cause fetal alcohol syndrome (FAS)  FAS is a group of conditions that causes mental, behavior, and growth problems  Talk to your healthcare provider before you take any medicines  Many medicines may harm your baby if you take them when you are pregnant  Do not take any medicines, vitamins, herbs, or supplements without first talking to your healthcare provider  Never use illegal or street drugs (such as marijuana or cocaine) while you are pregnant  Safety tips during pregnancy:   Avoid hot tubs and saunas  Do not use a hot tub or sauna while you are pregnant, especially during your first trimester  Hot tubs and saunas may raise your baby's temperature and increase the risk of birth defects  Avoid toxoplasmosis  This is an infection caused by eating raw meat or being around infected cat feces  It can cause birth defects, miscarriages, and other problems  Wash your hands after you touch raw meat  Make sure any meat is well-cooked before you eat it  Avoid raw eggs and unpasteurized milk   Use gloves or ask someone else to clean your cat's litter box while you are pregnant  Changes that are happening with your baby:  By 30 weeks, your baby may weigh more than 3 pounds  Your baby may be about 11 inches long from the top of the head to the rump (baby's bottom)  Your baby's eyes open and close now  Your baby's kicks and movements are more forceful at this time  What you need to know about prenatal care: Your healthcare provider will check your blood pressure and weight  You may also need the following:  Blood tests  may be done to check for anemia or blood type  A urine test  may also be done to check for sugar and protein  These can be signs of gestational diabetes or infection  Protein in your urine may also be a sign of preeclampsia  Preeclampsia is a condition that can develop during week 20 or later of your pregnancy  It causes high blood pressure, and it can cause problems with your kidneys and other organs  A Tdap vaccine and flu vaccine  may be recommended by your healthcare provider  A gestational diabetes screen  may be done  Your healthcare provider may order either a 1-step or 2-step oral glucose tolerance test (OGTT)  1-step OGTT:  Your blood sugar level will be tested after you have not eaten for 8 hours (fasting)  You will then be given a glucose drink  Your level will be tested again 1 hour and 2 hours after you finish the drink  2-step OGTT:  You do not have to fast for the first part of the test  You will have the glucose drink at any time of day  Your blood sugar level will be checked 1 hour later  If your blood sugar is higher than a certain level, another test will be ordered  You will fast and your blood sugar level will be tested  You will have the glucose drink  Your blood will be tested again 1 hour, 2 hours, and 3 hours after you finish the glucose drink  Fundal height  is a measurement of your uterus to check your baby's growth  This number is usually the same as the number of weeks that you have been pregnant   Your healthcare provider may also check your baby's position  Your baby's heart rate  will be checked  Follow up with your doctor or obstetrician as directed:  Write down your questions so you remember to ask them during your visits  © Copyright [employer] 2022 Information is for End User's use only and may not be sold, redistributed or otherwise used for commercial purposes  All illustrations and images included in CareNotes® are the copyrighted property of A D A M , Inc  or [facility] Barb Spann   The above information is an  only  It is not intended as medical advice for individual conditions or treatments  Talk to your doctor, nurse or pharmacist before following any medical regimen to see if it is safe and effective for you

## 2023-01-24 NOTE — PROGRESS NOTES
OB/GYN  PN Visit  Raymon Keith  [id number]  2023  2:25 PM  Dr Jazzmine Zambrano MD    S: 29 y o  Presley Gave 30w1d here for PN visit  Chief Complaint   Patient presents with   • Routine Prenatal Visit         OB complaints:  Contractions: no  Leakage: no  Bleeding: no  Fetal movement: no      O:  /56   Pulse 98   Ht 5' 2" (1 575 m)   Wt 67 1 kg (148 lb)   LMP 2022 (Exact Date)   BMI 27 07 kg/m²       Review of Systems   Constitutional: Negative  HENT: Negative  Eyes: Negative  Respiratory: Negative  Cardiovascular: Negative  Gastrointestinal: Negative  Endocrine: Negative  Genitourinary:        As noted in HPI   Musculoskeletal: Negative  Skin: Negative  Allergic/Immunologic: Negative  Neurological: Negative  Hematological: Negative  Psychiatric/Behavioral: Negative  Physical Exam  Constitutional:       General: She is not in acute distress  Appearance: She is well-developed  Abdominal:      Palpations: Abdomen is soft  Tenderness: There is no abdominal tenderness  There is no guarding  Neurological:      Mental Status: She is alert and oriented to person, place, and time  Skin:     General: Skin is warm and dry     Psychiatric:         Behavior: Behavior normal              Pregravid Weight/BMI: 61 2 kg (135 lb) (BMI 24 69)  Current Weight: 67 1 kg (148 lb)   Total Weight Gain: 5 897 kg (13 lb)   Pre-Jostin Vitals    Flowsheet Row Most Recent Value   Prenatal Assessment    Fetal Heart Rate 158   Fundal Height (cm) 29 cm   Movement Present   Prenatal Vitals    Blood Pressure 112/56   Weight - Scale 67 1 kg (148 lb)   Urine Albumin/Glucose    Dilation/Effacement/Station    Vaginal Drainage    Edema            Problem List        Endocrine    Diet controlled gestational diabetes mellitus (GDM) in third trimester    Overview     glucola at 18 weeks elevated > 200  Diabetic education referral scheduled   Pt desires to complete 3 hr GTT regardless - abnormal, 2/4 elevated  Fetal echo WNL             Other    30 weeks gestation of pregnancy    Encounter for supervision of normal first pregnancy in third trimester    Overview     S/p COVID vaccine + booster   S/p flu vaccine  NIPT, msAFP low risk          Family history of diabetes mellitus    Overview     Father, multiple aunts/uncles   Early glucola recommended - elevated > 200          Blood sugars well controlled   TWG 25-35 lbs recommended  Growth scan in 2 weeks  Follow-up in 2 weeks      Occasional swelling of legs - uses compression stockings - will monitor        Future Appointments   Date Time Provider Luis Kaba   2/10/2023  8:00 AM Manolo Houston MD Complete  [facility]   2023  2:00 PM   [address]   2023 10:00 AM Manolo Houston MD Complete [facility] Frederick Gan MD  2023  2:25 PM

## 2023-02-07 ENCOUNTER — TELEPHONE (OUTPATIENT)
Dept: PERINATAL CARE | Facility: OTHER | Age: 33
End: 2023-02-07

## 2023-02-07 NOTE — TELEPHONE ENCOUNTER
Left patient a message that her 2/13 M appointment had to be rescheduled from 2pm to 2:30pm  The new time, date and location were provided  The patient has been instructed to please call us back at [phone] with any questions or concerns

## 2023-02-10 ENCOUNTER — ROUTINE PRENATAL (OUTPATIENT)
Dept: OBGYN CLINIC | Facility: CLINIC | Age: 33
End: 2023-02-10

## 2023-02-10 VITALS
HEIGHT: 62 IN | WEIGHT: 146.6 LBS | HEART RATE: 96 BPM | SYSTOLIC BLOOD PRESSURE: 112 MMHG | DIASTOLIC BLOOD PRESSURE: 56 MMHG | BODY MASS INDEX: 26.98 KG/M2

## 2023-02-10 DIAGNOSIS — Z34.03 ENCOUNTER FOR SUPERVISION OF NORMAL FIRST PREGNANCY IN THIRD TRIMESTER: ICD-10-CM

## 2023-02-10 DIAGNOSIS — Z3A.32 32 WEEKS GESTATION OF PREGNANCY: ICD-10-CM

## 2023-02-10 DIAGNOSIS — O24.410 DIET CONTROLLED GESTATIONAL DIABETES MELLITUS (GDM) IN THIRD TRIMESTER: Primary | ICD-10-CM

## 2023-02-10 LAB
DME PARACHUTE DELIVERY DATE ACTUAL: NORMAL
DME PARACHUTE DELIVERY DATE REQUESTED: NORMAL
DME PARACHUTE ITEM DESCRIPTION: NORMAL
DME PARACHUTE ORDER STATUS: NORMAL
DME PARACHUTE SUPPLIER NAME: NORMAL
DME PARACHUTE SUPPLIER PHONE: NORMAL

## 2023-02-10 NOTE — PROGRESS NOTES
S: 29 y o  Josselin Holley here for routine prenatal visit  Chief Complaint   Patient presents with   • Routine Prenatal Visit         OB complaints:  Contractions: no  Leakage: no  Bleeding: no  Fetal movement: yes      O:  /56   Pulse 96   Ht 5' 2" (1 575 m)   Wt 66 5 kg (146 lb 9 6 oz)   LMP 2022 (Exact Date)   BMI 26 81 kg/m²       Review of Systems   Constitutional: Negative for chills and fever  Eyes: Negative for visual disturbance  Respiratory: Negative for chest tightness and shortness of breath  Cardiovascular: Negative for chest pain  Gastrointestinal: Negative for abdominal pain, diarrhea, nausea and vomiting  Genitourinary: Negative for pelvic pain and vaginal bleeding  As noted in HPI   Skin: Negative for rash  Neurological: Negative for headaches  All other systems reviewed and are negative  Physical Exam  Constitutional:       General: She is not in acute distress  Appearance: Normal appearance  HENT:      Head: Normocephalic and atraumatic  Cardiovascular:      Rate and Rhythm: Normal rate  Pulmonary:      Effort: Pulmonary effort is normal  No respiratory distress  Abdominal:      General: There is no distension  Palpations: Abdomen is soft  Tenderness: There is no abdominal tenderness  There is no guarding or rebound  Comments: gravid   Neurological:      General: No focal deficit present  Mental Status: She is alert  Psychiatric:         Mood and Affect: Mood normal          Behavior: Behavior normal    Vitals and nursing note reviewed             Fundal Height (cm): 31 cm  Fetal Heart Rate: 155    Pregravid Weight/BMI: 61 2 kg (135 lb) (BMI 24 69)  Current Weight: 66 5 kg (146 lb 9 6 oz)   Total Weight Gain: 5 262 kg (11 lb 9 6 oz)     Pre-Jostin Vitals    Flowsheet Row Most Recent Value   Prenatal Assessment    Fetal Heart Rate 155   Fundal Height (cm) 31 cm   Movement Present   Prenatal Vitals    Blood Pressure 112/56   Weight - Scale 66 5 kg (146 lb 9 6 oz)   Urine Albumin/Glucose    Dilation/Effacement/Station    Vaginal Drainage    Edema                   Problem List        Endocrine    Diet controlled gestational diabetes mellitus (GDM) in third trimester    Overview     glucola at 18 weeks elevated > 200  Diabetic education referral scheduled   Pt desires to complete 3 hr GTT regardless - abnormal, 2/4 elevated  Fetal echo WNL          Current Assessment & Plan     Growth US scheduled next week  accuchecks well controlled            Other    32 weeks gestation of pregnancy    Encounter for supervision of normal first pregnancy in third trimester    Overview     S/p COVID vaccine + booster   S/p flu vaccine  NIPT, msAFP low risk   S/p tdap          Current Assessment & Plan     She reviewed consents and will bring next time to sign   OB precautions reviewed         Family history of diabetes mellitus    Overview     Father, multiple aunts/uncles   Early glucola recommended - elevated > 200                              Laura Bai MD  2/10/2023  8:22 AM

## 2023-02-13 ENCOUNTER — ULTRASOUND (OUTPATIENT)
Dept: PERINATAL CARE | Facility: OTHER | Age: 33
End: 2023-02-13

## 2023-02-13 VITALS
HEART RATE: 92 BPM | DIASTOLIC BLOOD PRESSURE: 60 MMHG | BODY MASS INDEX: 27.46 KG/M2 | HEIGHT: 62 IN | SYSTOLIC BLOOD PRESSURE: 98 MMHG | WEIGHT: 149.2 LBS

## 2023-02-13 DIAGNOSIS — Z36.89 ENCOUNTER FOR ULTRASOUND TO CHECK FETAL GROWTH: ICD-10-CM

## 2023-02-13 DIAGNOSIS — Z3A.33 33 WEEKS GESTATION OF PREGNANCY: Primary | ICD-10-CM

## 2023-02-13 DIAGNOSIS — O24.410 DIET CONTROLLED GESTATIONAL DIABETES MELLITUS (GDM) IN THIRD TRIMESTER: ICD-10-CM

## 2023-02-13 NOTE — PROGRESS NOTES
[address]: Ms Sarah Barriga was seen today for fetal growth assessment ultrasound  See ultrasound report under "OB Procedures" tab  Please don't hesitate to contact our office with any concerns or questions    -Elsi Austin MD

## 2023-02-21 ENCOUNTER — ROUTINE PRENATAL (OUTPATIENT)
Dept: OBGYN CLINIC | Facility: CLINIC | Age: 33
End: 2023-02-21

## 2023-02-21 VITALS
HEIGHT: 62 IN | BODY MASS INDEX: 27.38 KG/M2 | WEIGHT: 148.8 LBS | SYSTOLIC BLOOD PRESSURE: 118 MMHG | DIASTOLIC BLOOD PRESSURE: 74 MMHG | HEART RATE: 77 BPM

## 2023-02-21 DIAGNOSIS — O24.410 DIET CONTROLLED GESTATIONAL DIABETES MELLITUS (GDM) IN THIRD TRIMESTER: ICD-10-CM

## 2023-02-21 DIAGNOSIS — Z3A.34 34 WEEKS GESTATION OF PREGNANCY: ICD-10-CM

## 2023-02-21 DIAGNOSIS — Z34.03 ENCOUNTER FOR SUPERVISION OF NORMAL FIRST PREGNANCY IN THIRD TRIMESTER: Primary | ICD-10-CM

## 2023-02-21 NOTE — PROGRESS NOTES
S: 29 y o  Max Awe 34w1d here for routine prenatal visit  Chief Complaint   Patient presents with   • Routine Prenatal Visit     Patient has been feeling a lot of pelvic pressure          OB complaints:  Contractions: no  Just pelvic pressure, some back pain when on her feet all day   Leakage: no  Bleeding: no  Fetal movement: yes      O:  /74   Pulse 77   Ht 5' 2" (1 575 m)   Wt 67 5 kg (148 lb 12 8 oz)   LMP 2022 (Exact Date)   BMI 27 22 kg/m²       Review of Systems   Constitutional: Negative for chills and fever  Eyes: Negative for visual disturbance  Respiratory: Negative for chest tightness and shortness of breath  Cardiovascular: Negative for chest pain  Gastrointestinal: Negative for abdominal pain, diarrhea, nausea and vomiting  Genitourinary: Negative for pelvic pain and vaginal bleeding  As noted in HPI   Skin: Negative for rash  Neurological: Negative for headaches  All other systems reviewed and are negative  Physical Exam  Constitutional:       General: She is not in acute distress  Appearance: Normal appearance  HENT:      Head: Normocephalic and atraumatic  Cardiovascular:      Rate and Rhythm: Normal rate  Pulmonary:      Effort: Pulmonary effort is normal  No respiratory distress  Abdominal:      General: There is no distension  Palpations: Abdomen is soft  Tenderness: There is no abdominal tenderness  There is no guarding or rebound  Comments: gravid   Neurological:      General: No focal deficit present  Mental Status: She is alert  Psychiatric:         Mood and Affect: Mood normal          Behavior: Behavior normal    Vitals and nursing note reviewed             Fundal Height (cm): 32 cm  Fetal Heart Rate: 140    Pregravid Weight/BMI: 61 2 kg (135 lb) (BMI 24 69)  Current Weight: 67 5 kg (148 lb 12 8 oz)   Total Weight Gain: 6 26 kg (13 lb 12 8 oz)     Pre-Jostin Vitals    Flowsheet Row Most Recent Value Prenatal Assessment    Fetal Heart Rate 140   Fundal Height (cm) 32 cm   Movement Present   Prenatal Vitals    Blood Pressure 118/74   Weight - Scale 67 5 kg (148 lb 12 8 oz)   Urine Albumin/Glucose    Dilation/Effacement/Station    Vaginal Drainage    Edema                   Problem List        Endocrine    Diet controlled gestational diabetes mellitus (GDM) in third trimester    Overview     glucola at 18 weeks elevated > 200  Diabetic education referral scheduled   Pt desires to complete 3 hr GTT regardless - abnormal, 2/4 elevated  Fetal echo WNL   - Growth EFW 18th%, repeat US scheduled          Current Assessment & Plan     accuchecks well controlled  Has repeat US scheduled             Other    34 weeks gestation of pregnancy    Encounter for supervision of normal first pregnancy in third trimester    Overview     S/p COVID vaccine + booster   S/p flu vaccine  NIPT, msAFP low risk   S/p tdap   Consents signed          Current Assessment & Plan     Consents reviewed and signed  OB precautions reviewed         Family history of diabetes mellitus    Overview     Father, multiple aunts/uncles   Early glucola recommended - elevated > 200                              Deni Jenkins MD  2/22/2023  8:54 AM

## 2023-03-01 PROBLEM — Z3A.36 36 WEEKS GESTATION OF PREGNANCY: Status: ACTIVE | Noted: 2022-08-12

## 2023-03-02 NOTE — PROGRESS NOTES
OB/GYN  PN Visit  Harpreet Dotson  [id number]  3/6/2023  10:10 AM  Dr Harrison Malin MD    S: 29 y o  Brandi Vuong 36 1/7 d  here for PN visit  Chief Complaint   Patient presents with   • Routine Prenatal Visit     No concerns     Occasional cramping on L side of abdomen with more movement      OB complaints:  Contractions: no  Leakage: no  Bleeding: no  Fetal movement: yes      O:  /70 (BP Location: Right arm, Patient Position: Sitting, Cuff Size: Adult)   Pulse 90   Ht 5' 2" (1 575 m)   Wt 68 5 kg (151 lb)   LMP 2022 (Exact Date)   BMI 27 62 kg/m²       Review of Systems   Constitutional: Negative  HENT: Negative  Eyes: Negative  Respiratory: Negative  Cardiovascular: Negative  Gastrointestinal: Negative  Endocrine: Negative  Genitourinary:        As noted in HPI   Musculoskeletal: Negative  Skin: Negative  Allergic/Immunologic: Negative  Neurological: Negative  Hematological: Negative  Psychiatric/Behavioral: Negative  Physical Exam  Constitutional:       General: She is not in acute distress  Appearance: She is well-developed  Abdominal:      Palpations: Abdomen is soft  Tenderness: There is no abdominal tenderness  There is no guarding  Neurological:      Mental Status: She is alert and oriented to person, place, and time  Skin:     General: Skin is warm and dry     Psychiatric:         Behavior: Behavior normal              Pregravid Weight/BMI: 61 2 kg (135 lb) (BMI 24 69)  Current Weight: 68 5 kg (151 lb)   Total Weight Gain: 7 258 kg (16 lb)   Pre-Jostin Vitals    Flowsheet Row Most Recent Value   Prenatal Assessment    Fetal Heart Rate 158   Fundal Height (cm) 33 cm   Movement Present   Prenatal Vitals    Blood Pressure 118/70   Weight - Scale 68 5 kg (151 lb)   Urine Albumin/Glucose    Dilation/Effacement/Station    Vaginal Drainage    Edema            Problem List        Endocrine    Diet controlled gestational diabetes mellitus (GDM) in third trimester    Overview     glucola at 18 weeks elevated > 200  Diabetic education referral scheduled   Pt desires to complete 3 hr GTT regardless - abnormal, 2/4 elevated  Fetal echo WNL   - Growth EFW 18th%, repeat US scheduled             Other    36 weeks gestation of pregnancy    Encounter for supervision of normal first pregnancy in third trimester    Overview     S/p COVID vaccine + booster   S/p flu vaccine  NIPT, msAFP low risk   S/p tdap   Consents signed          Family history of diabetes mellitus    Overview     Father, multiple aunts/uncles   Early glucola recommended - elevated > 200        Other Visit Diagnoses     Third trimester pregnancy    -  Primary    Encounter for  screening for Streptococcus B             [facility] at [facility] for GDM  Diet controlled GDM   C/o carpal tunne R>L, using splints, exercises, declines PT  GBS collected, no PCN allergy  Declines cervical exam        Future Appointments   Date Time Provider Luis Kaba   3/14/2023  4:45 PM Chelsie Barone MD [facility]   3/16/2023  3:30 PM   [address]   3/21/2023  4:45 PM Poornima Sheppard MD [facility]   3/30/2023  4:45 PM Chelsie Barone MD [facility] Zonia Win MD  3/6/2023  10:10 AM

## 2023-03-02 NOTE — PATIENT INSTRUCTIONS
Pregnancy at 28 to [address]:   You are considered full term at the beginning of 37 weeks  Your breathing may be easier if your baby has moved down into a head-down position  You may need to urinate more often because the baby may be pressing on your bladder  You may also feel more discomfort and get tired easily  DISCHARGE INSTRUCTIONS:   Return to the emergency department if:   You develop a severe headache that does not go away  You have new or increased vision changes, such as blurred or spotted vision  You have new or increased swelling in your face or hands  You have vaginal spotting or bleeding  Your water broke or you feel warm water gushing or trickling from your vagina  Call your obstetrician if:   You have more than 5 contractions in 1 hour  You notice any changes in your baby's movements  You have abdominal cramps, pressure, or tightening  You have a change in vaginal discharge  You have chills or a fever  You have vaginal itching, burning, or pain  You have yellow, green, white, or foul-smelling vaginal discharge  You have pain or burning when you urinate, less urine than usual, or pink or bloody urine  You have questions or concerns about your condition or care  How to care for yourself at this stage of your pregnancy:       Eat a variety of healthy foods  Healthy foods include fruits, vegetables, whole-grain breads, low-fat dairy foods, beans, lean meats, and fish  Drink liquids as directed  Ask how much liquid to drink each day and which liquids are best for you  Limit caffeine to less than 200 milligrams each day  Limit your intake of fish to 2 servings each week  Choose fish low in mercury such as canned light tuna, shrimp, salmon, cod, or tilapia  Do not  eat fish high in mercury such as swordfish, tilefish, chris mackerel, and shark  Take prenatal vitamins as directed    Your need for certain vitamins and minerals, such as folic acid, increases during pregnancy  Prenatal vitamins provide some of the extra vitamins and minerals you need  Prenatal vitamins may also help to decrease the risk of certain birth defects  Rest as needed  Put your feet up if you have swelling in your ankles and feet  Talk to your healthcare provider about exercise  Moderate exercise can help you stay fit  Your healthcare provider will help you plan an exercise program that is safe for you during pregnancy  Do not smoke  Smoking increases your risk of a miscarriage and other health problems during your pregnancy  Smoking can cause your baby to be born early or weigh less at birth  Ask your healthcare provider for information if you need help quitting  Do not drink alcohol  Alcohol passes from your body to your baby through the placenta  It can affect your baby's brain development and cause fetal alcohol syndrome (FAS)  FAS is a group of conditions that causes mental, behavior, and growth problems  Talk to your healthcare provider before you take any medicines  Many medicines may harm your baby if you take them when you are pregnant  Do not take any medicines, vitamins, herbs, or supplements without first talking to your healthcare provider  Never use illegal or street drugs (such as marijuana or cocaine) while you are pregnant  Safety tips:   Avoid hot tubs and saunas  Do not use a hot tub or sauna while you are pregnant, especially during your first trimester  Hot tubs and saunas may raise your baby's temperature and increase the risk of birth defects  Avoid toxoplasmosis  This is an infection caused by eating raw meat or being around infected cat feces  It can cause birth defects, miscarriages, and other problems  Wash your hands after you touch raw meat  Make sure any meat is well-cooked before you eat it  Avoid raw eggs and unpasteurized milk   Use gloves or ask someone else to clean your cat's litter box while you are pregnant  Ask your healthcare provider about travel  The most comfortable time to travel is during the second trimester  Ask your provider if you can travel after 36 weeks  You may not be able to travel in an airplane after 36 weeks  He or she may also recommend you avoid long road trips  Changes happening with your baby:  By 38 weeks, your baby may weigh between 6 and 9 pounds  Your baby may be about 14 inches long from the top of the head to the rump (baby's bottom)  Your baby hears well enough to know your voice  As your baby gets larger, you may feel fewer kicks and more stretching and rolling  Your baby may move into a head-down position  Your baby will also rest lower in your abdomen  What you need to know about prenatal care: Your healthcare provider will check your blood pressure and weight  You may also need the following:  A urine test  may also be done to check for sugar and protein  These can be signs of gestational diabetes or infection  Protein in your urine may also be a sign of preeclampsia  Preeclampsia is a condition that can develop during week 20 or later of your pregnancy  It causes high blood pressure, and it can cause problems with your kidneys and other organs  A gestational diabetes screen  may be done  Your healthcare provider may order either a 1-step or 2-step oral glucose tolerance test (OGTT)  1-step OGTT:  Your blood sugar level will be tested after you have not eaten for 8 hours (fasting)  You will then be given a glucose drink  Your level will be tested again 1 hour and 2 hours after you finish the drink  2-step OGTT:  You do not have to fast for the first part of the test  You will have the glucose drink at any time of day  Your blood sugar level will be checked 1 hour later  If your blood sugar is higher than a certain level, another test will be ordered  You will fast and your blood sugar level will be tested  You will have the glucose drink   Your blood will be tested again 1 hour, 2 hours, and 3 hours after you finish the glucose drink  A blood test  may be done to check for anemia (low iron level)  A Tdap vaccine  may be recommended by your healthcare provider  A group B strep test  is a test that is done to check for group B strep infection  Group B strep is a type of bacteria that may be found in the vagina or rectum  It can be passed to your baby during delivery if you have it  Your healthcare provider will take swab your vagina or rectum and send the sample to the lab for tests  Fundal height  is a measurement of your uterus to check your baby's growth  This number is usually the same as the number of weeks that you have been pregnant  Your healthcare provider may also check your baby's position  Your baby's heart rate  will be checked  Follow up with your obstetrician as directed:  Write down your questions so you remember to ask them during your visits  © Copyright [employer] 2022 Information is for End User's use only and may not be sold, redistributed or otherwise used for commercial purposes  The above information is an  only  It is not intended as medical advice for individual conditions or treatments  Talk to your doctor, nurse or pharmacist before following any medical regimen to see if it is safe and effective for you

## 2023-03-06 ENCOUNTER — ROUTINE PRENATAL (OUTPATIENT)
Dept: OBGYN CLINIC | Facility: CLINIC | Age: 33
End: 2023-03-06

## 2023-03-06 VITALS
DIASTOLIC BLOOD PRESSURE: 70 MMHG | SYSTOLIC BLOOD PRESSURE: 118 MMHG | HEIGHT: 62 IN | HEART RATE: 90 BPM | BODY MASS INDEX: 27.79 KG/M2 | WEIGHT: 151 LBS

## 2023-03-06 DIAGNOSIS — Z34.03 ENCOUNTER FOR SUPERVISION OF NORMAL FIRST PREGNANCY IN THIRD TRIMESTER: ICD-10-CM

## 2023-03-06 DIAGNOSIS — O24.410 DIET CONTROLLED GESTATIONAL DIABETES MELLITUS (GDM) IN THIRD TRIMESTER: ICD-10-CM

## 2023-03-06 DIAGNOSIS — Z34.93 THIRD TRIMESTER PREGNANCY: Primary | ICD-10-CM

## 2023-03-06 DIAGNOSIS — Z36.85 ENCOUNTER FOR ANTENATAL SCREENING FOR STREPTOCOCCUS B: ICD-10-CM

## 2023-03-06 DIAGNOSIS — Z83.3 FAMILY HISTORY OF DIABETES MELLITUS: ICD-10-CM

## 2023-03-06 DIAGNOSIS — Z3A.36 36 WEEKS GESTATION OF PREGNANCY: ICD-10-CM

## 2023-03-08 LAB — GP B STREP DNA SPEC QL NAA+PROBE: NEGATIVE

## 2023-03-09 ENCOUNTER — HOSPITAL ENCOUNTER (OUTPATIENT)
Facility: HOSPITAL | Age: 33
Discharge: HOME | End: 2023-03-09
Attending: OBSTETRICS & GYNECOLOGY | Admitting: OBSTETRICS & GYNECOLOGY

## 2023-03-09 ENCOUNTER — TELEMEDICINE (OUTPATIENT)
Dept: INTERNAL MEDICINE CLINIC | Facility: CLINIC | Age: 33
End: 2023-03-09

## 2023-03-09 ENCOUNTER — PATIENT MESSAGE (OUTPATIENT)
Dept: OBGYN CLINIC | Facility: CLINIC | Age: 33
End: 2023-03-09

## 2023-03-09 VITALS
HEART RATE: 89 BPM | OXYGEN SATURATION: 98 % | DIASTOLIC BLOOD PRESSURE: 74 MMHG | RESPIRATION RATE: 16 BRPM | SYSTOLIC BLOOD PRESSURE: 125 MMHG | TEMPERATURE: 98.3 F

## 2023-03-09 VITALS
HEART RATE: 89 BPM | SYSTOLIC BLOOD PRESSURE: 125 MMHG | HEIGHT: 62 IN | BODY MASS INDEX: 27.79 KG/M2 | TEMPERATURE: 98.3 F | DIASTOLIC BLOOD PRESSURE: 74 MMHG | WEIGHT: 151 LBS

## 2023-03-09 DIAGNOSIS — U07.1 COVID-19 VIRUS INFECTION: Primary | ICD-10-CM

## 2023-03-09 LAB
FLUAV RNA RESP QL NAA+PROBE: NEGATIVE
FLUBV RNA RESP QL NAA+PROBE: NEGATIVE
RSV RNA RESP QL NAA+PROBE: NEGATIVE
SARS-COV-2 RNA RESP QL NAA+PROBE: POSITIVE

## 2023-03-09 NOTE — PROGRESS NOTES
L&D Triage Note - OB/GYN  Chad Mao 29 y o  female MRN: [id number]  Unit/Bed#: L&D 321-01 Encounter: [id number]      ASSESSMENT:    Chad Mao is a 29 y o  Eyad Antis at 36w3d presenting with decreased fetal movement and cold sxs  PLAN:    1) DFM   -FHT reactive and reasuring   -ARI 12 62cm    2) COVID   -Provided patient with list of medications that are safe to take in pregnancy   -Advised masking around other people    3) Continue routine prenatal care  4) Discharge from 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 that she has worked with the last few days  She reports decreased fetal movement over the last day although she was still meeting the kick count  This being in triage she has felt the baby move  She denies any contractions, vaginal bleeding, or leakage of fluid      Contractions: none  Leakage of fluid: none  Vaginal Bleeding: none  Fetal movement: Decreased    OBJECTIVE:    Vitals:    23 1215   BP: 130/83   Pulse: 96   Resp: 18   Temp: 98 5 °F (36 9 °C)       ROS:  Constitutional: Congestion, sore throat, body aches  Respiratory: Negative  Cardiovascular: Negative    Gastrointestinal: Negative    General Physical Exam:  General: in no apparent distress and well developed and well nourished  Cardiovascular: Cor RRR  Lungs: non-labored breathing  Abdomen: abdomen is soft without significant tenderness, masses, organomegaly or guarding  Lower extremeties: nontender    Cervical Exam  Speculum: deferred  SVE: deferred    Fetal monitoring:  FHT:  145 bpm/ Moderate 6 - 25 bpm / 15 x 15 accelerations present, no decelerations  [place]: irritability        Abd  US   ARI      - Q1 2 52cm     - Q2 4 56cm     - Q3 3 69cm     - Q4 1 85cm     - Total: 12 62cm   Placenta: Anterior   Presentation: Rosa M Duff MD,  OBGYN PGY-4  3/9/2023 1:03 PM

## 2023-03-09 NOTE — PROGRESS NOTES
Patient discharged to home by doctor  Discharge instructions/precautions reviewed with patient by Dr Melany Dubose  Patient verbalizes understanding & denies further questions at this time

## 2023-03-09 NOTE — ASSESSMENT & PLAN NOTE
Mild case  Stable  Currently 36-week pregnant however vaccinated plus booster  We discussed antiviral and considering that symptoms are mild we have agreed on not proceeding at this time with Paxlovid  She will continue to monitor her symptoms and if they worsen within the next 24 to 48 hours can initiate Paxlovid   -Case was discussed with her OB/GYN Dr Dorado Asia via TT and she agrees with Paxlovid if necessary     -Advised Tylenol 325 mg every 6 hours for the next 24 to 48 hours and OTC cold medication  Patient advised on isolation precautions and to monitor symptoms for worsening/complications and to contact our office or seek medical care in the emergency room if symptoms are severe

## 2023-03-09 NOTE — PATIENT COMMUNICATION
Pt called,  having decreased fetal movement since yesterday  Pt also experiencing cold sx, took Sudafed yesterday, per Dr Marroquin Failing, sending pt in for eval      On call  and L&D charge nurse notified

## 2023-03-09 NOTE — PROGRESS NOTES
Virtual Regular Visit    Verification of patient location:    Patient is located in the following state in which I hold an active license [place]    CC COVID + today with PCR test  Onset on 3/07 in the PM with sore throat and congestion  Progressively worse a/w fatigue, muscle aches, h/a, nasal pressure over her forehead, chills in the AM yesterday  Denies fever, abd pain, n/v/d/abd pain, SOB, CP, chest tightness  36 weeks pregnant  Vaccinated + booster      Assessment/Plan:    Problem List Items Addressed This Visit        Other    COVID-19 virus infection - Primary     Mild case  Stable  Currently 36-week pregnant however vaccinated plus booster  We discussed antiviral and considering that symptoms are mild we have agreed on not proceeding at this time with Paxlovid  She will continue to monitor her symptoms and if they worsen within the next 24 to 48 hours can initiate Paxlovid   -Case was discussed with her OB/GYN Dr Lulu Castrejon via TT and she agrees with Paxlovid if necessary  -Advised Tylenol 325 mg every 6 hours for the next 24 to 48 hours and OTC cold medication  Patient advised on isolation precautions and to monitor symptoms for worsening/complications and to contact our office or seek medical care in the emergency room if symptoms are severe                   Reason for visit is   Chief Complaint   Patient presents with   • COVID-19     COVID Positive Today   Sore Throat, stuffy nose and congestion started Tuesday    • Virtual Regular Visit        Encounter provider Adore Bello MD    Provider located at [facility] [id number]      Recent Visits  No visits were found meeting these conditions    Showing recent visits within past 7 days and meeting all other requirements  Today's Visits  Date Type Provider Dept   03/09/23 Devon Reyes MD [facility]   Showing today's visits and meeting all other requirements  Future Appointments  No visits were found meeting these conditions  Showing future appointments within next 150 days and meeting all other requirements       The patient was identified by name and date of birth  Melanie Casas was informed that this is a telemedicine visit and that the visit is being conducted through the [employer]  She agrees to proceed     My office door was closed  No one else was in the room  She acknowledged consent and understanding of privacy and security of the video platform  The patient has agreed to participate and understands they can discontinue the visit at any time  Patient is aware this is a billable service  Subjective  Melanie Casas is a 29 y o  female    HPI     No past medical history on file  Past Surgical History:   Procedure Laterality Date   • NO PAST SURGERIES         Current Outpatient Medications   Medication Sig Dispense Refill   • acetaminophen (TYLENOL) 500 mg tablet Take 500 mg by mouth every 6 (six) hours as needed for mild pain     • Blood Glucose Monitoring Suppl (Contour Next EZ) w/Device KIT Test x4 Daily or as instructed 1 kit 0   • Contour Next Test test strip Test 4 Times Daily or as instructed 100 strip 7   • Docosahexaenoic Acid (PRENATAL DHA PO) Take by mouth     • Doxylamine Succinate, Sleep, (UNISOM PO)      • Microlet Lancets MISC Use 4 a Day or as instructed 100 each 7   • Prenatal Vit-Fe Fumarate-FA (PRENATAL VITAMINS PO) Take by mouth       No current facility-administered medications for this visit          No Known Allergies    Review of Systems    Video Exam    Vitals:    03/09/23 1548   BP: 125/74   Pulse: 89   Temp: 98 3 °F (36 8 °C)   Weight: 68 5 kg (151 lb)   Height: 5' 2" (1 575 m)       Physical Exam   Physical Exam: limited, performed thorough video  GENERAL: NAD  HEENT:  Grossly normal  CARDIAC:  Unable to assess  PULMONARY:  non-labored breathing, no conversational dyspnea noted   ABDOMEN: Grossly normal  Extremities: Grossly normal  NEUROLOGIC:  Alert/oriented x3      I spent 15 minutes directly with the patient during this visit

## 2023-03-14 ENCOUNTER — ROUTINE PRENATAL (OUTPATIENT)
Dept: OBGYN CLINIC | Facility: CLINIC | Age: 33
End: 2023-03-14

## 2023-03-14 VITALS
SYSTOLIC BLOOD PRESSURE: 110 MMHG | TEMPERATURE: 96.4 F | WEIGHT: 150 LBS | DIASTOLIC BLOOD PRESSURE: 70 MMHG | HEART RATE: 71 BPM | BODY MASS INDEX: 27.44 KG/M2

## 2023-03-14 DIAGNOSIS — Z3A.36 36 WEEKS GESTATION OF PREGNANCY: ICD-10-CM

## 2023-03-14 DIAGNOSIS — U07.1 COVID-19 VIRUS INFECTION: ICD-10-CM

## 2023-03-14 DIAGNOSIS — Z34.03 ENCOUNTER FOR SUPERVISION OF NORMAL FIRST PREGNANCY IN THIRD TRIMESTER: ICD-10-CM

## 2023-03-14 DIAGNOSIS — O24.410 DIET CONTROLLED GESTATIONAL DIABETES MELLITUS (GDM) IN THIRD TRIMESTER: ICD-10-CM

## 2023-03-14 DIAGNOSIS — Z34.93 THIRD TRIMESTER PREGNANCY: Primary | ICD-10-CM

## 2023-03-14 DIAGNOSIS — Z83.3 FAMILY HISTORY OF DIABETES MELLITUS: ICD-10-CM

## 2023-03-14 NOTE — PROGRESS NOTES
OB/GYN  PN Visit  Della Glasgow  [id number]  3/14/2023  6:43 PM  Dr Finn Hope MD    S: 29 y o  Randy Fontan 37w1d here for PN visit  Chief Complaint   Patient presents with   • Routine Prenatal Visit     Pt reports no concerns     COVID infection but feeling better  Mild nasal congestion  No fever      OB complaints:  Contractions: no  Leakage: no  Bleeding: no  Fetal movement: yes      O:  /70   Pulse 71   Temp (!) 96 4 °F (35 8 °C) (Tympanic)   Wt 68 kg (150 lb)   LMP 2022 (Exact Date)   BMI 27 44 kg/m²       Review of Systems   Constitutional: Negative  HENT: Negative  Eyes: Negative  Respiratory: Negative  Cardiovascular: Negative  Gastrointestinal: Negative  Endocrine: Negative  Genitourinary:        As noted in HPI   Musculoskeletal: Negative  Skin: Negative  Allergic/Immunologic: Negative  Neurological: Negative  Hematological: Negative  Psychiatric/Behavioral: Negative  Physical Exam  Constitutional:       General: She is not in acute distress  Appearance: She is well-developed  Abdominal:      Palpations: Abdomen is soft  Tenderness: There is no abdominal tenderness  There is no guarding  Neurological:      Mental Status: She is alert and oriented to person, place, and time  Skin:     General: Skin is warm and dry     Psychiatric:         Behavior: Behavior normal          Initial blood pressure was 130/82 - repeat was 110/70    Pregravid Weight/BMI: 61 2 kg (135 lb) (BMI 24 69)  Current Weight: 68 kg (150 lb)   Total Weight Gain: 6 804 kg (15 lb)   Pre- Vitals    Flowsheet Row Most Recent Value   Prenatal Assessment    Fetal Heart Rate 143   Fundal Height (cm) 33 cm   Movement Present   Prenatal Vitals    Blood Pressure 110/70   Weight - Scale 68 kg (150 lb)   Urine Albumin/Glucose    Dilation/Effacement/Station    Vaginal Drainage    Edema            Problem List        Endocrine    Diet controlled gestational diabetes mellitus (GDM) in third trimester    Overview     glucola at 18 weeks elevated > 200  Diabetic education referral scheduled   Pt desires to complete 3 hr GTT regardless - abnormal, 2/4 elevated  Fetal echo WNL   - Growth EFW 18th%, repeat US scheduled             Other    36 weeks gestation of pregnancy    Encounter for supervision of normal first pregnancy in third trimester    Overview     S/p COVID vaccine + booster   S/p flu vaccine  NIPT, msAFP low risk   S/p tdap   Consents signed          Family history of diabetes mellitus    Overview     Father, multiple aunts/uncles   Early glucola recommended - elevated > 200         COVID-19 virus infection    Overview     Growth scans planned due to GDM        Other Visit Diagnoses     Third trimester pregnancy    -  Primary         Patient with recent COVID infection but recovering well  She has been vaccinated and boosted  She has a growth scan planned in 2 days  Follow-up in 1 week for routine OB visit  GBS results were discussed and noted to be negative    Future Appointments   Date Time Provider Luis Kaba   3/16/2023  3:30 PM   [facility]   3/21/2023  4:45 PM Chrystal Abebe MD [facility]   3/30/2023  4:45 PM Amber Spangler MD Complete Derrick Hua MD  3/14/2023  6:43 PM

## 2023-03-14 NOTE — PATIENT INSTRUCTIONS
Pregnancy at [address] to [address]:   You are considered full term at the beginning of 37 weeks  Your breathing may be easier if your baby has moved down into a head-down position  You may need to urinate more often because the baby may be pressing on your bladder  You may also feel more discomfort and get tired easily  DISCHARGE INSTRUCTIONS:   Return to the emergency department if:   You develop a severe headache that does not go away  You have new or increased vision changes, such as blurred or spotted vision  You have new or increased swelling in your face or hands  You have vaginal spotting or bleeding  Your water broke or you feel warm water gushing or trickling from your vagina  Call your obstetrician if:   You have more than 5 contractions in 1 hour  You notice any changes in your baby's movements  You have abdominal cramps, pressure, or tightening  You have a change in vaginal discharge  You have chills or a fever  You have vaginal itching, burning, or pain  You have yellow, green, white, or foul-smelling vaginal discharge  You have pain or burning when you urinate, less urine than usual, or pink or bloody urine  You have questions or concerns about your condition or care  How to care for yourself at this stage of your pregnancy:       Eat a variety of healthy foods  Healthy foods include fruits, vegetables, whole-grain breads, low-fat dairy foods, beans, lean meats, and fish  Drink liquids as directed  Ask how much liquid to drink each day and which liquids are best for you  Limit caffeine to less than 200 milligrams each day  Limit your intake of fish to 2 servings each week  Choose fish low in mercury such as canned light tuna, shrimp, salmon, cod, or tilapia  Do not  eat fish high in mercury such as swordfish, tilefish, chris mackerel, and shark  Take prenatal vitamins as directed    Your need for certain vitamins and minerals, such as folic acid, increases during pregnancy  Prenatal vitamins provide some of the extra vitamins and minerals you need  Prenatal vitamins may also help to decrease the risk of certain birth defects  Rest as needed  Put your feet up if you have swelling in your ankles and feet  Talk to your healthcare provider about exercise  Moderate exercise can help you stay fit  Your healthcare provider will help you plan an exercise program that is safe for you during pregnancy  Do not smoke  Smoking increases your risk of a miscarriage and other health problems during your pregnancy  Smoking can cause your baby to be born early or weigh less at birth  Ask your healthcare provider for information if you need help quitting  Do not drink alcohol  Alcohol passes from your body to your baby through the placenta  It can affect your baby's brain development and cause fetal alcohol syndrome (FAS)  FAS is a group of conditions that causes mental, behavior, and growth problems  Talk to your healthcare provider before you take any medicines  Many medicines may harm your baby if you take them when you are pregnant  Do not take any medicines, vitamins, herbs, or supplements without first talking to your healthcare provider  Never use illegal or street drugs (such as marijuana or cocaine) while you are pregnant  Safety tips:   Avoid hot tubs and saunas  Do not use a hot tub or sauna while you are pregnant, especially during your first trimester  Hot tubs and saunas may raise your baby's temperature and increase the risk of birth defects  Avoid toxoplasmosis  This is an infection caused by eating raw meat or being around infected cat feces  It can cause birth defects, miscarriages, and other problems  Wash your hands after you touch raw meat  Make sure any meat is well-cooked before you eat it  Avoid raw eggs and unpasteurized milk   Use gloves or ask someone else to clean your cat's litter box while you are pregnant  Ask your healthcare provider about travel  The most comfortable time to travel is during the second trimester  Ask your provider if you can travel after 36 weeks  You may not be able to travel in an airplane after 36 weeks  He or she may also recommend you avoid long road trips  Changes happening with your baby:  By 38 weeks, your baby may weigh between 6 and 9 pounds  Your baby may be about 14 inches long from the top of the head to the rump (baby's bottom)  Your baby hears well enough to know your voice  As your baby gets larger, you may feel fewer kicks and more stretching and rolling  Your baby may move into a head-down position  Your baby will also rest lower in your abdomen  What you need to know about prenatal care: Your healthcare provider will check your blood pressure and weight  You may also need the following:  A urine test  may also be done to check for sugar and protein  These can be signs of gestational diabetes or infection  Protein in your urine may also be a sign of preeclampsia  Preeclampsia is a condition that can develop during week 20 or later of your pregnancy  It causes high blood pressure, and it can cause problems with your kidneys and other organs  A gestational diabetes screen  may be done  Your healthcare provider may order either a 1-step or 2-step oral glucose tolerance test (OGTT)  1-step OGTT:  Your blood sugar level will be tested after you have not eaten for 8 hours (fasting)  You will then be given a glucose drink  Your level will be tested again 1 hour and 2 hours after you finish the drink  2-step OGTT:  You do not have to fast for the first part of the test  You will have the glucose drink at any time of day  Your blood sugar level will be checked 1 hour later  If your blood sugar is higher than a certain level, another test will be ordered  You will fast and your blood sugar level will be tested  You will have the glucose drink   Your blood will be tested again 1 hour, 2 hours, and 3 hours after you finish the glucose drink  A blood test  may be done to check for anemia (low iron level)  A Tdap vaccine  may be recommended by your healthcare provider  A group B strep test  is a test that is done to check for group B strep infection  Group B strep is a type of bacteria that may be found in the vagina or rectum  It can be passed to your baby during delivery if you have it  Your healthcare provider will take swab your vagina or rectum and send the sample to the lab for tests  Fundal height  is a measurement of your uterus to check your baby's growth  This number is usually the same as the number of weeks that you have been pregnant  Your healthcare provider may also check your baby's position  Your baby's heart rate  will be checked  Follow up with your obstetrician as directed:  Write down your questions so you remember to ask them during your visits  © Copyright [employer] 2022 Information is for End User's use only and may not be sold, redistributed or otherwise used for commercial purposes  The above information is an  only  It is not intended as medical advice for individual conditions or treatments  Talk to your doctor, nurse or pharmacist before following any medical regimen to see if it is safe and effective for you

## 2023-03-16 ENCOUNTER — HOSPITAL ENCOUNTER (OUTPATIENT)
Facility: HOSPITAL | Age: 33
Discharge: HOME | End: 2023-03-16
Attending: OBSTETRICS & GYNECOLOGY | Admitting: OBSTETRICS & GYNECOLOGY

## 2023-03-16 ENCOUNTER — ULTRASOUND (OUTPATIENT)
Dept: PERINATAL CARE | Facility: OTHER | Age: 33
End: 2023-03-16

## 2023-03-16 VITALS
HEIGHT: 62 IN | TEMPERATURE: 98.9 F | DIASTOLIC BLOOD PRESSURE: 73 MMHG | SYSTOLIC BLOOD PRESSURE: 127 MMHG | BODY MASS INDEX: 28.89 KG/M2 | RESPIRATION RATE: 18 BRPM | WEIGHT: 157 LBS | HEART RATE: 88 BPM

## 2023-03-16 VITALS
WEIGHT: 157 LBS | HEIGHT: 62 IN | DIASTOLIC BLOOD PRESSURE: 76 MMHG | HEART RATE: 67 BPM | SYSTOLIC BLOOD PRESSURE: 120 MMHG | BODY MASS INDEX: 28.89 KG/M2

## 2023-03-16 DIAGNOSIS — Z3A.37 37 WEEKS GESTATION OF PREGNANCY: ICD-10-CM

## 2023-03-16 DIAGNOSIS — O36.5930 POOR FETAL GROWTH AFFECTING MANAGEMENT OF MOTHER IN THIRD TRIMESTER, SINGLE OR UNSPECIFIED FETUS: Primary | ICD-10-CM

## 2023-03-16 LAB — GLUCOSE SERPL-MCNC: 73 MG/DL (ref 65–140)

## 2023-03-16 NOTE — LETTER
March 16, 2023     Sharlynn Mcardle, [address] [facility],[address]  [place] [id number]    Patient: Elva Gupta   YOB: 1990   Date of Visit: 3/16/2023       Dear Dr Kimber Lockhart: Thank you for referring Elva Gupta to me for evaluation  Below are my notes for this consultation  If you have questions, please do not hesitate to call me  I look forward to following your patient along with you  Sincerely,        Salomón Bai MD        CC: MD Salomón Perry MD  3/16/2023  4:24 PM  Sign when Signing Visit  On exam, Teri Meredith appears well, in no acute distress, and has no complaints although she is recently recovering from an upper respiratory infection secondary to [place]  Fetal weight is at the 6th%tile and the abdominal circumference is less than the 3rd% for gestational age  This is consistent with suspected mild fetal growth restriction in accordance with ACOG guidelines  Good fetal movement and tone are appreciated and the amniotic fluid volume is normal   Fetal breathing was not appreciated in 30 minutes  Biophysical profile is 6/8 umbilical artery PI is appropriate for gestational age at <95th%  The patient was informed of today's findings and all of her questions were answered to apparent satisfaction  The various etiologies of FGR were discussed  The limitations of accuracy ultrasound were reviewed with the patient  We discussed fetal kick counts in detail and I encouraged her to perform daily fetal kick counts and to call her Obstetrician with any concerns whatsoever  Recommend patient go to labor and delivery immediately for nonstress test   If nonstress test is reactive, recommend scheduling for induction of labor at 38 weeks gestation  If any concerns whatsoever with fetal testing, recommend delivery at this time  Thank you very much for allowing us to participate in the care of this very nice patient   Should you have any questions, please do not hesitate to contact me  Portions of the record may have been created with voice recognition software  Occasional wrong word or "sound a like" substitutions may have occurred due to the inherent limitations of voice recognition software  Read the chart carefully and recognize, using context, where substitutions have occurred

## 2023-03-16 NOTE — PROGRESS NOTES
L&D Triage Note - OB/GYN  Deepak Clark 29 y o  female MRN: [id number]  Unit/Bed#: L&D 324-01 Encounter: [id number]    Patient is seen by [address]  Deepak Clark is a 29 y o   at 37w3d sent from the [facility]  (-2 for breathing) with reactive monitoring    PLAN  #1  BPP 6/8 (-2 for breathing):  · BPP 6/8 (-2 for breathing) at  center today (getting AFS for FGR & A1GDM)  · Patient feels well without complaints  · Extended fetal monitoring reactive  · [place] with irregular contractions which patient does not feel  · Patient declines SVE  · IOL scheduled at 38w0d on 3/20/23 at 7 am as recommended by [facility]    Discharge instructions  · Patient instructed to call if experiencing worsening contractions, vaginal bleeding, loss of fluid or decreased fetal movement  · Will follow up with OBGYN on 3/21/23  D/w Dr Santiago Hams  ______________    SUBJECTIVE    OLIMPIA: Estimated Date of Delivery: 4/3/23    HPI:  29 y o   37w3d sent from the  [facility]  (-2 for breathing)  She is getting AFS for FGR  She feels well with no complaints and denies cramping, contractions, vaginal bleeding, leaking fluid, or decreased fetal movement  She declines SVE today  Scheduled IOL for FGR at 38w0d on 3/20/23 at 7 am     Her obstetrical history is significant for FGR, A1GDM    ROS:  Constitutional: Negative  Respiratory: Negative  Cardiovascular: Negative    Gastrointestinal: Negative    OBJECTIVE:  /73 (BP Location: Right arm)   Pulse 88   Temp 98 9 °F (37 2 °C) (Oral)   Resp 18   Ht 5' 2" (1 575 m)   Wt 71 2 kg (157 lb)   LMP 2022 (Exact Date)   BMI 28 72 kg/m²   Body mass index is 28 72 kg/m²  Physical Exam  Constitutional:       General: She is not in acute distress  Appearance: Normal appearance  She is not ill-appearing  HENT:      Mouth/Throat:      Mouth: Mucous membranes are moist    Eyes:      Extraocular Movements: Extraocular movements intact  Cardiovascular:      Rate and Rhythm: Normal rate  Pulmonary:      Effort: Pulmonary effort is normal    Abdominal:      General: There is no distension  Palpations: Abdomen is soft  Tenderness: There is no abdominal tenderness  There is no guarding  Musculoskeletal:         General: No swelling  Right lower leg: No edema  Left lower leg: No edema  Skin:     General: Skin is warm and dry  Coloration: Skin is not jaundiced  Neurological:      General: No focal deficit present  Mental Status: She is alert  Psychiatric:         Mood and Affect: Mood normal          Behavior: Behavior normal          Thought Content:  Thought content normal          Judgment: Judgment normal          SVE:  Patient declined    FHT:  Baseline: 135 bpm  Variability: moderate  Accelerations: present  Decelerations: absent  NST reactive    TOCO:   Irregular contractions no closer than 5 min    IMAGING:     TAUS today at  center   ARI      - Q1 3 2 cm     - Q2 4 1 cm     - Q3 4 5 cm     - Q4 3 2 cm     - Total: 15 0 cm   Placenta: left lateral   Presentation: cephalic    Labs:   Recent Results (from the past 24 hour(s))   Fingerstick Glucose (POCT)    Collection Time: 23  5:33 PM   Result Value Ref Range    POC Glucose 73 65 - 140 mg/dl         Chelsie Calvillo MD  3/16/2023  7:08 PM

## 2023-03-16 NOTE — PROGRESS NOTES
On exam, Yumi Araujo appears well, in no acute distress, and has no complaints although she is recently recovering from an upper respiratory infection secondary to [place]  Fetal weight is at the 6th%tile and the abdominal circumference is less than the 3rd% for gestational age  This is consistent with suspected mild fetal growth restriction in accordance with ACOG guidelines  Good fetal movement and tone are appreciated and the amniotic fluid volume is normal   Fetal breathing was not appreciated in 30 minutes  Biophysical profile is 6/8 umbilical artery PI is appropriate for gestational age at <95th%  The patient was informed of today's findings and all of her questions were answered to apparent satisfaction  The various etiologies of FGR were discussed  The limitations of accuracy ultrasound were reviewed with the patient  We discussed fetal kick counts in detail and I encouraged her to perform daily fetal kick counts and to call her Obstetrician with any concerns whatsoever  Recommend patient go to labor and delivery immediately for nonstress test   If nonstress test is reactive, recommend scheduling for induction of labor at 38 weeks gestation  If any concerns whatsoever with fetal testing, recommend delivery at this time  Thank you very much for allowing us to participate in the care of this very nice patient  Should you have any questions, please do not hesitate to contact me  Portions of the record may have been created with voice recognition software  Occasional wrong word or "sound a like" substitutions may have occurred due to the inherent limitations of voice recognition software  Read the chart carefully and recognize, using context, where substitutions have occurred

## 2023-03-17 ENCOUNTER — TELEPHONE (OUTPATIENT)
Dept: OBGYN CLINIC | Facility: CLINIC | Age: 33
End: 2023-03-17

## 2023-03-17 NOTE — TELEPHONE ENCOUNTER
Patient calling in regards to being seen at [facility] yesterday  Patient states [facility] told her she will be induced on Monday  Patient had questions for provider if she can receive a call back to ask her questions, since she only spoke to an [facility] provider  Please advise

## 2023-03-17 NOTE — TELEPHONE ENCOUNTER
Patient had questions about induction of labor process in general   The patient is scheduled to come in Monday morning at 38 weeks    Offered to start patient on March 19, 2023  Admission and to start induction at midnight of March 20 - pt prefers this  Called L and D to reschedule - pt to come in on Sunday night at 10 pm

## 2023-03-18 ENCOUNTER — HOSPITAL ENCOUNTER (OUTPATIENT)
Facility: HOSPITAL | Age: 33
Discharge: HOME | End: 2023-03-18
Attending: OBSTETRICS & GYNECOLOGY | Admitting: OBSTETRICS & GYNECOLOGY

## 2023-03-18 ENCOUNTER — HOSPITAL ENCOUNTER (OUTPATIENT)
Dept: LABOR AND DELIVERY | Facility: HOSPITAL | Age: 33
Discharge: HOME | End: 2023-03-18

## 2023-03-18 VITALS
DIASTOLIC BLOOD PRESSURE: 72 MMHG | TEMPERATURE: 97.7 F | RESPIRATION RATE: 18 BRPM | HEART RATE: 93 BPM | SYSTOLIC BLOOD PRESSURE: 125 MMHG

## 2023-03-18 NOTE — DISCHARGE INSTRUCTIONS
Induction of Labor   AMBULATORY CARE:   Induction of labor  is a procedure to induce (start) your labor before it begins on its own  Medicines and other methods are used to start contractions and help your cervix soften, thin (efface), and dilate (open)  You may be given antibiotics to fight a bacterial infection you have or prevent an infection during delivery  Reasons you may need induction of labor:   A health problem you have, such as high blood pressure or diabetes    A health problem your baby has, such as a slow heartbeat or poor growth inside the womb     Problems related to your pregnancy, such as infection of the amniotic fluid, your water breaks before labor begins, or you have too little amniotic fluid    What happens during induction of labor: Your healthcare provider may use one or more of the following to induce labor:  Cervical ripening  is a process that helps to soften and thin out your cervix  Medicines called prostaglandins may be used to ripen your cervix  These medicines can be inserted into your vagina or taken as a pill  Other methods can also be used to dilate the cervix  This includes a catheter with an inflatable balloon on the end that is inserted into your cervix  Saline injected through the catheter helps the balloon to expand  A substance that absorbs water may also be inserted into your cervix to help dilate it  Stripping the membranes  is a procedure that causes your body to release prostaglandins naturally  Prostaglandins soften the cervix and may help to cause contractions  Your healthcare provider will sweep a gloved finger over the membranes that connect the amniotic sac to the uterus wall  Rupturing the amniotic sac  is a procedure that is used to cause your water to break  Your healthcare provider will use a small tool to make a hole in your amniotic sac  This may help contractions to start       Oxytocin  may be given through an IV to cause contractions to start and stay strong and regular  Risks of induction of labor:  Medicines used to induce labor may cause too many contractions  This can lower your baby's heartbeat and decrease his or her oxygen supply  Induction of labor also increases the risk of umbilical cord prolapse  This condition causes the umbilical cord to slip back into the vagina before delivery  It can compress the cord and decrease your baby's oxygen supply  Medical induction may cause an infection in you or your baby  Medical induction may also increase your risk for a  section (), especially if it is the first time you give birth  Your uterus may rupture if you have had a  before  ©  Jed [employer]  Information is for End User's use only and may not be sold, redistributed or otherwise used for commercial purposes  The above information is an  only  It is not intended as medical advice for individual conditions or treatments  Talk to your doctor, nurse or pharmacist before following any medical regimen to see if it is safe and effective for you

## 2023-03-18 NOTE — PROCEDURES
Lola Kay, a  at 37w5d with an OLIMPIA of 4/3/2023, by Last Menstrual Period, was seen at [facility] for the following procedure(s): $Procedure Type: NST]    Nonstress Test  Reason for NST: Intrauterine growth restriction  Variability: Moderate  Decelerations: None  Accelerations: Yes  Acoustic Stimulator: No  Baseline: 145 BPM  Uterine Irritability: No  Contractions: Not present    Interpretation  Nonstress Test Interpretation: Reactive  Overall Impression: Reassuring    NST was performed due to indication of fetal growth restriction, she is for induction tomorrow night at 10 PM     She is feeling good fetal movement, no vaginal bleeding, mild irregular cramping along her right side  SVE: cl/thick/high    Aldair Moffett , [facility] Gynecology PGY-3  3/18/2023  10:33 AM

## 2023-03-19 ENCOUNTER — HOSPITAL ENCOUNTER (INPATIENT)
Facility: HOSPITAL | Age: 33
LOS: 4 days | Discharge: HOME | End: 2023-03-23
Attending: OBSTETRICS & GYNECOLOGY | Admitting: OBSTETRICS & GYNECOLOGY

## 2023-03-19 ENCOUNTER — HOSPITAL ENCOUNTER (OUTPATIENT)
Dept: LABOR AND DELIVERY | Facility: HOSPITAL | Age: 33
Discharge: HOME | End: 2023-03-19

## 2023-03-19 DIAGNOSIS — Z3A.37 37 WEEKS GESTATION OF PREGNANCY: Primary | ICD-10-CM

## 2023-03-19 LAB
ERYTHROCYTE [DISTWIDTH] IN BLOOD BY AUTOMATED COUNT: 13.2 % (ref 11.6–15.1)
GLUCOSE SERPL-MCNC: 86 MG/DL (ref 65–140)
HCT VFR BLD AUTO: 38.3 % (ref 34.8–46.1)
HGB BLD-MCNC: 12.7 G/DL (ref 11.5–15.4)
HOLD SPECIMEN: NORMAL
MCH RBC QN AUTO: 29.9 PG (ref 26.8–34.3)
MCHC RBC AUTO-ENTMCNC: 33.2 G/DL (ref 31.4–37.4)
MCV RBC AUTO: 90 FL (ref 82–98)
PLATELET # BLD AUTO: 171 THOUSANDS/UL (ref 149–390)
PMV BLD AUTO: 12.2 FL (ref 8.9–12.7)
RBC # BLD AUTO: 4.25 MILLION/UL (ref 3.81–5.12)
WBC # BLD AUTO: 10.87 THOUSAND/UL (ref 4.31–10.16)

## 2023-03-19 PROCEDURE — 4A1HXCZ MONITORING OF PRODUCTS OF CONCEPTION, CARDIAC RATE, EXTERNAL APPROACH: ICD-10-PCS | Performed by: STUDENT IN AN ORGANIZED HEALTH CARE EDUCATION/TRAINING PROGRAM

## 2023-03-19 RX ORDER — BUPIVACAINE HYDROCHLORIDE 2.5 MG/ML
30 INJECTION, SOLUTION EPIDURAL; INFILTRATION; INTRACAUDAL ONCE AS NEEDED
Status: DISCONTINUED | OUTPATIENT
Start: 2023-03-19 | End: 2023-03-21

## 2023-03-19 RX ORDER — SODIUM CHLORIDE, SODIUM LACTATE, POTASSIUM CHLORIDE, CALCIUM CHLORIDE 600; 310; 30; 20 MG/100ML; MG/100ML; MG/100ML; MG/100ML
125 INJECTION, SOLUTION INTRAVENOUS CONTINUOUS
Status: DISCONTINUED | OUTPATIENT
Start: 2023-03-19 | End: 2023-03-19

## 2023-03-19 RX ORDER — ACETAMINOPHEN 325 MG/1
975 TABLET ORAL EVERY 8 HOURS PRN
Status: DISCONTINUED | OUTPATIENT
Start: 2023-03-19 | End: 2023-03-21

## 2023-03-19 RX ORDER — CALCIUM CARBONATE 200(500)MG
1000 TABLET,CHEWABLE ORAL 3 TIMES DAILY PRN
Status: DISCONTINUED | OUTPATIENT
Start: 2023-03-19 | End: 2023-03-21

## 2023-03-19 RX ORDER — ONDANSETRON 2 MG/ML
4 INJECTION INTRAMUSCULAR; INTRAVENOUS EVERY 6 HOURS PRN
Status: DISCONTINUED | OUTPATIENT
Start: 2023-03-19 | End: 2023-03-21

## 2023-03-19 RX ORDER — SODIUM CHLORIDE 9 MG/ML
125 INJECTION, SOLUTION INTRAVENOUS CONTINUOUS
Status: DISCONTINUED | OUTPATIENT
Start: 2023-03-19 | End: 2023-03-21

## 2023-03-19 RX ADMIN — SODIUM CHLORIDE 125 ML/HR: 0.9 INJECTION, SOLUTION INTRAVENOUS at 23:30

## 2023-03-20 LAB
ABO GROUP BLD: NORMAL
BLD GP AB SCN SERPL QL: NEGATIVE
GLUCOSE SERPL-MCNC: 76 MG/DL (ref 65–140)
GLUCOSE SERPL-MCNC: 77 MG/DL (ref 65–140)
GLUCOSE SERPL-MCNC: 85 MG/DL (ref 65–140)
GLUCOSE SERPL-MCNC: 90 MG/DL (ref 65–140)
GLUCOSE SERPL-MCNC: 99 MG/DL (ref 65–140)
GLUCOSE SERPL-MCNC: 99 MG/DL (ref 65–140)
RH BLD: POSITIVE
SPECIMEN EXPIRATION DATE: NORMAL
TREPONEMA PALLIDUM IGG+IGM AB [PRESENCE] IN SERUM OR PLASMA BY IMMUNOASSAY: NORMAL

## 2023-03-20 RX ORDER — OXYTOCIN/RINGER'S LACTATE 30/500 ML
1-30 PLASTIC BAG, INJECTION (ML) INTRAVENOUS
Status: DISCONTINUED | OUTPATIENT
Start: 2023-03-20 | End: 2023-03-21

## 2023-03-20 RX ORDER — OXYTOCIN/RINGER'S LACTATE 30/500 ML
1-30 PLASTIC BAG, INJECTION (ML) INTRAVENOUS
Status: DISCONTINUED | OUTPATIENT
Start: 2023-03-20 | End: 2023-03-20

## 2023-03-20 RX ORDER — BUTORPHANOL TARTRATE 1 MG/ML
1 INJECTION, SOLUTION INTRAMUSCULAR; INTRAVENOUS ONCE
Status: COMPLETED | OUTPATIENT
Start: 2023-03-20 | End: 2023-03-20

## 2023-03-20 RX ORDER — PROMETHAZINE HYDROCHLORIDE 25 MG/ML
25 INJECTION, SOLUTION INTRAMUSCULAR; INTRAVENOUS ONCE
Status: COMPLETED | OUTPATIENT
Start: 2023-03-20 | End: 2023-03-20

## 2023-03-20 RX ADMIN — Medication 25 MCG: at 04:20

## 2023-03-20 RX ADMIN — Medication 25 MCG: at 10:12

## 2023-03-20 RX ADMIN — BUTORPHANOL TARTRATE 1 MG: 1 INJECTION, SOLUTION INTRAMUSCULAR; INTRAVENOUS at 22:48

## 2023-03-20 RX ADMIN — PROMETHAZINE HYDROCHLORIDE 25 MG: 25 INJECTION INTRAMUSCULAR; INTRAVENOUS at 23:01

## 2023-03-20 RX ADMIN — Medication 25 MCG: at 00:11

## 2023-03-20 RX ADMIN — Medication 2 MILLI-UNITS/MIN: at 09:58

## 2023-03-20 NOTE — OB LABOR/OXYTOCIN SAFETY PROGRESS
Labor Progress Note - Abdullahi Cruz 29 y o  female MRN: [id number]    Unit/Bed#: L&D 322-01 Encounter: [id number]       Contraction Frequency (minutes): N/A  Contraction Quality: Mild  Tachysystole: No   Cervical Dilation: 1        Cervical Effacement: 30  Fetal Station: -3  Baseline Rate: 145 bpm                     Vital Signs:   Vitals:    03/20/23 0124   BP: 91/55   Pulse: 67   Resp:    Temp:        Notes/comments:   Patient resting comfortably, feeling only period-like cramps  SVE 1/30/-3, posterior, firm,  Mendez balloon placed as outlined below, and 2nd dose of Cytotec placed as well  FHT category 1 with no contractions on toco      PROCEDURE:  MENDEZ BALLOON PLACEMENT  A 24F mendez with a 30cc balloon was selected, a cervical examination was performed and the cervix was located  A mendez balloon was introduced over sterile gloved hands  Balloon advanced through cervix beyond the internal cervical os  A small amount amount of sterile saline solution was instilled in the balloon to confirm placement  Placement was confirmed to be beyond the internal cervical os  A total of 60cc of sterile saline solution was placed into the balloon  Pt tolerated well  Instructions left with RN to place mendez to gravity with a 1L bag of IV fluid  Notify DO/MD when mendez dislodged          Jd Hannon MD 3/20/2023 4:23 AM

## 2023-03-20 NOTE — OB LABOR/OXYTOCIN SAFETY PROGRESS
Oxytocin Safety Progress Check Note - Estefani Julian 29 y o  female MRN: [id number]    Unit/Bed#: L&D 322-01 Encounter: [id number]    Dose (yenifer-units/min) Oxytocin: 4 yenifer-units/min  Contraction Frequency (minutes): 1 5-4  Contraction Quality: Mild  Tachysystole: No   Cervical Dilation: 3        Cervical Effacement: 50  Fetal Station: -3  Baseline Rate: 135 bpm  Fetal Heart Rate: 140 BPM  FHR Category: Category I           Vital Signs:   Vitals:    03/20/23 1627   BP: 128/67   Pulse: 56   Resp:    Temp:        Notes/comments:   Pt is starting to feel contractions  SVE deferred  FHT cat 1 jane q3 mins  Will continue titrating pitocin at this time      Dr Kelli Aaron aware    Lexy Santiago MD 3/20/2023 4:39 PM

## 2023-03-20 NOTE — OB LABOR/OXYTOCIN SAFETY PROGRESS
Oxytocin Safety Progress Check Note - Abdullahi Cruz 29 y o  female MRN: [id number]    Unit/Bed#: L&D 322-01 Encounter: [id number]    Dose (yenifer-units/min) Oxytocin: 10 yenifer-units/min  Contraction Frequency (minutes): 1-3  Contraction Quality: Mild  Tachysystole: No   Cervical Dilation: 3        Cervical Effacement: 50  Fetal Station: -3  Baseline Rate: 145 bpm  Fetal Heart Rate: 145 BPM  FHR Category: Category I               Vital Signs:   Vitals:    03/20/23 1826   BP: 131/70   Pulse: 59   Resp:    Temp:        Notes/comments:   Patient resting comfortably, feeling contractions every 1-3 min but not too uncomfortable with them  SVE unchanged, posterior and firm  FHT category 1 with contractions every 1-3 min  Pit running at 10  Earlier when switch from Cytotec to Pitocin was made, she was jane too much for Cytotec  Dr Homar Randolph and I discussed with the patient that her cervix is still firm and thick and would likely benefit from further ripening  Discussed option to hold pitocin and see if contractions space enough to allow additional Cytotec  Patient understands and is agreeable with plan      · Hold pit  · Patient can eat dinner  · Monitor for 1 hr s/p pit and can come off monitor for shower if category 1  · Will re-dose Cytotec 1 hr s/p Pitocin discontinuation if contractions space        Jd Hannon MD 3/20/2023 7:05 PM

## 2023-03-20 NOTE — H&P
Kelsey [address] Dr 29 y o  female MRN: [id number]  Unit/Bed#: L&D 322-01 Encounter: [id number]    Assessment: 29 y o   at 37w6d admitted for scheduled IOL for FGR  Plan:   * 37 weeks gestation of pregnancy  Assessment & Plan  FHT category 1  [place] with rare irregular contracitons  SVE 0/0/-3, posterior, firm  Cephalic on US  · Admit for scheduled IOL for FGR starting with Cytotec starting at midnight  · CLD  · Continuous fetal monitoring  · Routine admission labs (CBC, T&S, syphilis panel)  · Analgesia at maternal request    Poor fetal growth affecting management of mother in third trimester  Assessment & Plan  37w US: EFW 5lb 6oz 6%, AC <3%, HC <5%    Diet controlled gestational diabetes mellitus (GDM) in third trimester  Assessment & Plan  Lab Results   Component Value Date    HGBA1C 4 6 2023       No results for input(s): POCGLU in the last 72 hours  Blood Sugar Average: Last 72 hrs:     · POCT glucose q2h x4 occurrences, can space to q4h if wnl    Discussed case and plan w/ Dr Tyler Ulloa    Chief Complaint: "I'm here for my induction"    HPI: Fauzia Miles is a 29 y o   with an OLIMPIA of 4/3/2023, by Last Menstrual Period at 37w6d who is being admitted for scheduled IOL for FGR  She denies having uterine contractions, has no LOF, and reports no VB  She states she has felt good FM  Patient Active Problem List   Diagnosis   • 37 weeks gestation of pregnancy   • Encounter for supervision of normal first pregnancy in third trimester   • Family history of diabetes mellitus   • Diet controlled gestational diabetes mellitus (GDM) in third trimester   • COVID-19 virus infection   • Poor fetal growth affecting management of mother in third trimester       Baby complications/comments: FGR (37w US with EFW 5lb 6oz 6%, AC <3%, HC <5%)    Review of Systems   Constitutional: Negative for chills and fever  Eyes: Negative for visual disturbance     Respiratory: Negative for cough and shortness of breath  Cardiovascular: Negative for chest pain and leg swelling  Gastrointestinal: Negative for abdominal pain, diarrhea, nausea and vomiting  Genitourinary: Negative for dysuria, frequency, hematuria, pelvic pain, urgency, vaginal bleeding and vaginal discharge  Neurological: Negative for dizziness, light-headedness and headaches  OB Hx:  OB History    Para Term  AB Living   1 0 0 0 0 0   SAB IAB Ectopic Multiple Live Births   0 0 0 0 0      # Outcome Date GA Lbr Paulie/2nd Weight Sex Delivery Anes PTL Lv   1 Current                Past Medical Hx:  History reviewed  No pertinent past medical history  Past Surgical hx:  Past Surgical History:   Procedure Laterality Date   • NO PAST SURGERIES         Social Hx:  Alcohol use: no  Tobacco use: no  Other substance use: no  Other: N/A    No Known Allergies    Medications Prior to Admission   Medication   • acetaminophen (TYLENOL) 500 mg tablet   • Blood Glucose Monitoring Suppl (Contour Next EZ) w/Device KIT   • Contour Next Test test strip   • Docosahexaenoic Acid (PRENATAL DHA PO)   • Doxylamine Succinate, Sleep, (UNISOM PO)   • Microlet Lancets MISC   • Prenatal Vit-Fe Fumarate-FA (PRENATAL VITAMINS PO)       Objective:  Temp:  [98 6 °F (37 °C)] 98 6 °F (37 °C)  HR:  [65] 65  Resp:  [18] 18  BP: (127)/(71) 127/71  Body mass index is 27 44 kg/m²  Physical Exam:  Physical Exam  Constitutional:       General: She is not in acute distress  Appearance: Normal appearance  She is not ill-appearing  HENT:      Mouth/Throat:      Mouth: Mucous membranes are moist    Eyes:      Extraocular Movements: Extraocular movements intact  Cardiovascular:      Rate and Rhythm: Normal rate and regular rhythm  Heart sounds: Normal heart sounds  Pulmonary:      Effort: Pulmonary effort is normal       Breath sounds: Normal breath sounds  Abdominal:      General: There is no distension  Palpations: Abdomen is soft  Tenderness: There is no abdominal tenderness  There is no guarding  Musculoskeletal:         General: No swelling  Right lower leg: No edema  Left lower leg: No edema  Neurological:      General: No focal deficit present  Mental Status: She is alert  Skin:     General: Skin is warm and dry  Coloration: Skin is not jaundiced or pale  Psychiatric:         Mood and Affect: Mood normal          Behavior: Behavior normal          Thought Content:  Thought content normal          Judgment: Judgment normal         Cephalic on US    FHT:  Baseline: 150 bpm  Variability: moderate  Accelerations: present  Decelerations: absent  Category 1    TOCO:   Rare irregular contractions    Lab Results   Component Value Date    WBC 10 23 (H) 01/06/2023    HGB 12 2 01/06/2023    HCT 37 1 01/06/2023     01/06/2023     Lab Results   Component Value Date    K 3 8 01/06/2023     01/06/2023    CO2 22 01/06/2023    BUN 11 01/06/2023    CREATININE 0 43 (L) 01/06/2023    AST 14 01/06/2023    ALT 24 01/06/2023     Prenatal Labs: Reviewed      Blood type: O positive  Antibody: negative  GBS: negative  HIV: negative  Rubella: immune  RPR: negative  HBsAg: negative  Hep C Ab: negative  Chlamydia: negative  Gonorrhea: negative  Diabetes 1 hour screen: 215  3 hour glucose: 93, 217, 270, 90  Platelets: 802  Hgb: 12 7  >2 Midnights  INPATIENT     Signature/Title: Nisha Dong MD  Date: 3/19/2023  Time: 10:43 PM

## 2023-03-20 NOTE — OB LABOR/OXYTOCIN SAFETY PROGRESS
Labor Progress Note - Jacqui Mcgee 29 y o  female MRN: [id number]    Unit/Bed#: L&D 322-01 Encounter: [id number]    Dose (yenifer-units/min) Oxytocin: 2 yenifer-units/min  Contraction Frequency (minutes): 2-6  Contraction Quality: Mild  Tachysystole: No   Cervical Dilation: 3        Cervical Effacement: 50  Fetal Station: -3  Baseline Rate: 145 bpm  Fetal Heart Rate: 140 BPM  FHR Category: Category I           Vital Signs:   Vitals:    03/20/23 1214   BP:    Pulse:    Resp:    Temp: 98 3 °F (36 8 °C)       Notes/comments:   Pt starting to feel cramping  SVE as above  FHT cat 1 jane q2-6 mins  Will start pitocin at this time      Dr Isis Cook MD 3/20/2023 2:40 PM

## 2023-03-20 NOTE — ASSESSMENT & PLAN NOTE
FHT category 1  [place] with rare irregular contracitons  SVE 0/0/-3, posterior, firm  Cephalic on US    · Admit for scheduled IOL for FGR starting with Cytotec starting at midnight  · CLD  · Continuous fetal monitoring  · Routine admission labs (CBC, T&S, syphilis panel)  · Analgesia at maternal request

## 2023-03-20 NOTE — OB LABOR/OXYTOCIN SAFETY PROGRESS
Labor Progress Note - Melvina Vazquez 29 y o  female MRN: [id number]    Unit/Bed#: L&D 322-01 Encounter: [id number]       Contraction Frequency (minutes): 2 5-3  Contraction Quality: Mild  Tachysystole: No   Cervical Dilation: 3        Cervical Effacement: 30  Fetal Station: -3  Baseline Rate: 145 bpm                 Vital Signs:   Vitals:    03/20/23 0800   BP: 121/72   Pulse: 71   Resp: 18   Temp: 97 7 °F (36 5 °C)       Notes/comments:   Pt is sleeping  Nichols balloon dislodged  SVE as above  FHT cat 1 with moderate variability, accelerations, no decelerations  Colin occasionally  Will start pitocin at this time      Dr Zabrina Stone aware    Lin Pirse MD 3/20/2023 9:38 AM

## 2023-03-21 ENCOUNTER — ANESTHESIA EVENT (INPATIENT)
Dept: ANESTHESIOLOGY | Facility: HOSPITAL | Age: 33
End: 2023-03-21

## 2023-03-21 ENCOUNTER — ANESTHESIA (INPATIENT)
Dept: ANESTHESIOLOGY | Facility: HOSPITAL | Age: 33
End: 2023-03-21

## 2023-03-21 LAB
GLUCOSE SERPL-MCNC: 105 MG/DL (ref 65–140)
GLUCOSE SERPL-MCNC: 109 MG/DL (ref 65–140)
GLUCOSE SERPL-MCNC: 110 MG/DL (ref 65–140)
GLUCOSE SERPL-MCNC: 96 MG/DL (ref 65–140)
GLUCOSE SERPL-MCNC: 98 MG/DL (ref 65–140)

## 2023-03-21 PROCEDURE — 0KQM0ZZ REPAIR PERINEUM MUSCLE, OPEN APPROACH: ICD-10-PCS | Performed by: STUDENT IN AN ORGANIZED HEALTH CARE EDUCATION/TRAINING PROGRAM

## 2023-03-21 RX ORDER — ROPIVACAINE HYDROCHLORIDE 2 MG/ML
INJECTION, SOLUTION EPIDURAL; INFILTRATION; PERINEURAL AS NEEDED
Status: DISCONTINUED | OUTPATIENT
Start: 2023-03-21 | End: 2023-03-21 | Stop reason: HOSPADM

## 2023-03-21 RX ORDER — DOCUSATE SODIUM 100 MG/1
100 CAPSULE, LIQUID FILLED ORAL 2 TIMES DAILY
Status: DISCONTINUED | OUTPATIENT
Start: 2023-03-21 | End: 2023-03-24 | Stop reason: HOSPADM

## 2023-03-21 RX ORDER — OXYTOCIN/RINGER'S LACTATE 30/500 ML
250 PLASTIC BAG, INJECTION (ML) INTRAVENOUS ONCE
Status: COMPLETED | OUTPATIENT
Start: 2023-03-21 | End: 2023-03-21

## 2023-03-21 RX ORDER — IBUPROFEN 600 MG/1
600 TABLET ORAL EVERY 6 HOURS
Status: DISCONTINUED | OUTPATIENT
Start: 2023-03-21 | End: 2023-03-24 | Stop reason: HOSPADM

## 2023-03-21 RX ORDER — DEXTROSE AND SODIUM CHLORIDE 5; .9 G/100ML; G/100ML
50 INJECTION, SOLUTION INTRAVENOUS CONTINUOUS
Status: DISCONTINUED | OUTPATIENT
Start: 2023-03-21 | End: 2023-03-21

## 2023-03-21 RX ORDER — ROPIVACAINE HYDROCHLORIDE 2 MG/ML
INJECTION, SOLUTION EPIDURAL; INFILTRATION; PERINEURAL CONTINUOUS PRN
Status: DISCONTINUED | OUTPATIENT
Start: 2023-03-21 | End: 2023-03-21 | Stop reason: HOSPADM

## 2023-03-21 RX ORDER — SODIUM CHLORIDE 9 MG/ML
75 INJECTION, SOLUTION INTRAVENOUS CONTINUOUS
Status: DISCONTINUED | OUTPATIENT
Start: 2023-03-21 | End: 2023-03-21

## 2023-03-21 RX ORDER — DIAPER,BRIEF,INFANT-TODD,DISP
1 EACH MISCELLANEOUS DAILY PRN
Status: DISCONTINUED | OUTPATIENT
Start: 2023-03-21 | End: 2023-03-24 | Stop reason: HOSPADM

## 2023-03-21 RX ORDER — ONDANSETRON 2 MG/ML
4 INJECTION INTRAMUSCULAR; INTRAVENOUS EVERY 8 HOURS PRN
Status: DISCONTINUED | OUTPATIENT
Start: 2023-03-21 | End: 2023-03-24 | Stop reason: HOSPADM

## 2023-03-21 RX ORDER — ACETAMINOPHEN 325 MG/1
650 TABLET ORAL EVERY 4 HOURS PRN
Status: DISCONTINUED | OUTPATIENT
Start: 2023-03-21 | End: 2023-03-24 | Stop reason: HOSPADM

## 2023-03-21 RX ORDER — CALCIUM CARBONATE 200(500)MG
1000 TABLET,CHEWABLE ORAL DAILY PRN
Status: DISCONTINUED | OUTPATIENT
Start: 2023-03-21 | End: 2023-03-24 | Stop reason: HOSPADM

## 2023-03-21 RX ADMIN — ROPIVACAINE HYDROCHLORIDE 8 ML/HR: 2 INJECTION, SOLUTION EPIDURAL; INFILTRATION; PERINEURAL at 01:14

## 2023-03-21 RX ADMIN — DEXTROSE AND SODIUM CHLORIDE 50 ML/HR: 5; .9 INJECTION, SOLUTION INTRAVENOUS at 05:51

## 2023-03-21 RX ADMIN — SODIUM CHLORIDE 75 ML/HR: 0.9 INJECTION, SOLUTION INTRAVENOUS at 05:52

## 2023-03-21 RX ADMIN — ROPIVACAINE HYDROCHLORIDE 5 ML: 2 INJECTION, SOLUTION EPIDURAL; INFILTRATION at 01:09

## 2023-03-21 RX ADMIN — ROPIVACAINE HYDROCHLORIDE 5 ML: 2 INJECTION, SOLUTION EPIDURAL; INFILTRATION at 01:12

## 2023-03-21 RX ADMIN — ROPIVACAINE HYDROCHLORIDE: 2 INJECTION, SOLUTION EPIDURAL; INFILTRATION at 01:15

## 2023-03-21 RX ADMIN — BENZOCAINE AND LEVOMENTHOL 1 APPLICATION.: 200; 5 SPRAY TOPICAL at 09:59

## 2023-03-21 RX ADMIN — SODIUM CHLORIDE 125 ML/HR: 0.9 INJECTION, SOLUTION INTRAVENOUS at 03:20

## 2023-03-21 RX ADMIN — IBUPROFEN 600 MG: 600 TABLET, FILM COATED ORAL at 09:59

## 2023-03-21 RX ADMIN — IBUPROFEN 600 MG: 600 TABLET, FILM COATED ORAL at 19:35

## 2023-03-21 RX ADMIN — DOCUSATE SODIUM 100 MG: 100 CAPSULE, LIQUID FILLED ORAL at 19:30

## 2023-03-21 RX ADMIN — DOCUSATE SODIUM 100 MG: 100 CAPSULE, LIQUID FILLED ORAL at 09:59

## 2023-03-21 RX ADMIN — SODIUM CHLORIDE 0.5 UNITS/HR: 9 INJECTION, SOLUTION INTRAVENOUS at 05:50

## 2023-03-21 RX ADMIN — SODIUM CHLORIDE 125 ML/HR: 0.9 INJECTION, SOLUTION INTRAVENOUS at 01:12

## 2023-03-21 RX ADMIN — Medication 250 MILLI-UNITS/MIN: at 08:27

## 2023-03-21 NOTE — OB LABOR/OXYTOCIN SAFETY PROGRESS
Oxytocin Safety Progress Check Note - Claire Ramirez 29 y o  female MRN: [id number]    Unit/Bed#: L&D 322-01 Encounter: [id number]    Dose (yenifer-units/min) Oxytocin: 6 yenifer-units/min  Contraction Frequency (minutes): 2-3  Contraction Quality: Moderate  Tachysystole: No   Cervical Dilation: 4        Cervical Effacement: 80  Fetal Station: -1  Baseline Rate: 155 bpm  Fetal Heart Rate: 150 BPM  FHR Category: Category I               Vital Signs:   Vitals:    03/21/23 0159   BP: 109/61   Pulse: 62   Resp:    Temp:        Notes/comments:   Patient resting comfortably s/p epidural   FHT with possible late decelerations, though cathy is 150 bpm which was previously her baseline, and so these may represent accelerations with return to baseline instead  Will reposition patient and give 500 mL bolus of IVF to help clarify FHT category  Moderate variability with accelerations, and acceleration noted with scalp stimulation upon SVE which is now 4/80/-1  Will consider AROM at next check given she just recently received her epidural   Contractions every 2- 3 min  Pit running at 6, continue titrating to contractions          Val Graham MD 3/21/2023 2:29 AM

## 2023-03-21 NOTE — OB LABOR/OXYTOCIN SAFETY PROGRESS
Oxytocin Safety Progress Check Note - Estefani Julian 29 y o  female MRN: [id number]    Unit/Bed#: L&D 322-01 Encounter: [id number]    Dose (yenifer-units/min) Oxytocin: 4 yenifer-units/min  Contraction Frequency (minutes): 3-6  Contraction Quality: Moderate  Tachysystole: No   Cervical Dilation: 4        Cervical Effacement: 80  Fetal Station: -1  Baseline Rate: 155 bpm  Fetal Heart Rate: 150 BPM  FHR Category: Category I               Vital Signs:   Vitals:    03/21/23 0415   BP: 107/59   Pulse: 76   Resp:    Temp:        Notes/comments:   Patient resting comfortably  FHT category 1  Contractions every 2-3 min  Pit running at 4, continue titrating to contractions  Will defer SVE and recheck in 2 hr (or sooner if clinically indicated) with plan to AROM at that time  Dr Kelli Aaron aware  2 sequential POCT glucose >100  Will initiate insulin infusion for intrapartum blood sugar management          Vinny Gilbert MD 3/21/2023 4:26 AM

## 2023-03-21 NOTE — LACTATION NOTE
This note was copied from a baby's chart  CONSULT - LACTATION  Baby Girl Blu Clark 0 days female MRN: [id number]    [address] [facility] NURSERY Room / Bed: L&D 309(N)/L&D 309(N) Encounter: [id number]    Maternal Information     MOTHER:  Melisa Clark  Maternal Age: 29 y o    OB History: # 1 - Date: 23, Sex: Female, Weight: 2225 g (4 lb 14 5 oz), GA: 38w1d, Delivery: Vaginal, Spontaneous, Apgar1: 8, Apgar5: 9, Living: Living, Birth Comments: None   Previouse breast reduction surgery? No    Lactation history:   Has patient previously breast fed: No   How long had patient previously breast fed:     Previous breast feeding complications:       Past Surgical History:   Procedure Laterality Date   • NO PAST SURGERIES          Birth information:  YOB: 2021   Time of birth: 7:32 AM   Sex: female   Delivery type: Vaginal, Spontaneous   Birth Weight: 2225 g (4 lb 14 5 oz)   Percent of Weight Change: 0%     Gestational Age: 43w4d   [unfilled]    Assessment     Breast and nipple assessment: normal assessment     Assessment: normal assessment    Feeding assessment: feeding well, sleepy  LATCH:  Latch: Repeated attempts, hold nipple in mouth, stimulate to suck   Audible Swallowing: A few with stimulation   Type of Nipple: Everted (After stimulation)   Comfort (Breast/Nipple): Soft/non-tender   Hold (Positioning): Partial assist, teach one side, mother does other, staff holds   LATCH Score: 7         Having latch problems?  No  (Sleepy)   Position(s) Used Football   Breasts/Nipples   Left Breast Soft   Right Breast Soft   Left Nipple Everted   Right Nipple Everted   Intervention Hand expression   Breastfeeding Status Yes   Breastfeeding Progress Not yet established   Breast Pump   Pump 3  (Has SS1)   Patient Follow-Up   Lactation Consult Status 2   Follow-Up Type Inpatient;Call as needed   Other OB Lactation Documentation    Additional Problem Noted Jackie Casas was relaxed at the breast  Justino Dillon was effective at hand expression with verbal guidance  (Reviewed RSB  D/C booklet at bedside )       Feeding recommendations:  breast feed on demand     Information on hand expression given  Discussed benefits of knowing how to manually express breast including stimulating milk supply, softening nipple for latch and evacuating breast in the event of engorgement  Reviewed how to bring baby to the breast so that her lower lip and chin touch the breast with her nose just above the nipple to encourage a wider, more asymmetric latch  Gently compress the breast as if offering a sandwich with your fingers and thumb in parallel with Baby Girl's lips  Bring Baby Girl to the breast so that her lower lip and chin touch the breast with her nose just above the nipple  Met with mother  Provided mother with Ready, Set, Baby booklet which contained information on:  Hand expression with access to QR codes to review hand expression  Positioning and latch reviewed as well as showing images of other feeding positions  Discussed the properties of a good latch in any position  Feeding on cue and what that means for recognizing infant's hunger, s/s that baby is getting enough milk and some s/s that breastfeeding dyad may need further help  Skin to Skin contact an benefits to mom and baby  Avoidance of pacifiers for the first month discussed  Gave information on common concerns, what to expect the first few weeks after delivery, preparing for other caregivers, and how partners can help  Resources for support also provided  Encouraged parents to call for assistance, questions, and concerns about breastfeeding  Extension provided        Emmanuel Johnson RN 3/21/2023 4:00 PM

## 2023-03-21 NOTE — L&D DELIVERY NOTE
OBGYN Vaginal Delivery Summary  Alisson Aldrich [address] y o  female MRN: [id number]  Unit/Bed#: L&D 309-01 Encounter: [id number]    Predelivery Diagnosis:  1  Pregnancy at 43w4d gestational age pregnancy    2  Intrauterine fetal growth restriction   3  Gestational Diabetes, Diet controlled    Postdelivery Diagnosis:  1  Same as above  2  Delivery of fullterm     Procedure: spontaneous vaginal delivery, repair of second degree laceration(s)    Attending: Dr Judith [place]  Assistant: Dr Harjeet Bennett    Anesthesia: Epidural    QBL: 30mL  Admission H 7 g/dL  Admission platelets: 647 thousands/uL    Complications: none apparent    Specimens: cord blood, arterial and venous cord blood gases, placenta to pathology     Findings:   1  Viable female at 0732, with APGARS of 8 and 9 at 1 and 5 minutes respectively  Weight pending at time of dictation  2  Spontaneous delivery of intact placenta at 0737  Central  insertion three vessel umbilical cord  3  Second  degree laceration repaired with 3-0 vicryl rapide   4  Blood gases:  Umbilical Cord Venous Blood Gas:  Pending at time of dictation  Umbilical Cord Arterial Blood Gas:    Pending at time of dictation    Disposition:  Patient tolerated the procedure well and was recovering in labor and delivery room  Brief history and labor course:  Alisson Aldrich is a [address] y o  Geralynn Nipper at 38wk1d  She presented to labor and delivery for induction of labor secondary to intrauterine growth restriction  Induction was started with misoprostol and gaxiola balloon for cervical ripening and followed with pitocin  At  she progressed to complete cervical dilation and began pushing  Description of procedure  After pushing for 5 minutes , the patient delivered a viable female  at 0 on 2023, weight pending at time of dictation, apgars of 8 (1 min) and 9 (5 min)  The fetal vertex delivered spontaneously  Baby restituted  to NEAL   There was one loose nuchal cord around baby's head that was reduced at time of delivery  The anterior (right) shoulder delivered atraumatically with maternal expulsive forces and the assistance of gentle downward traction  The posterior shoulder delivered with maternal expulsive forces and the assistance of gentle upward traction  The remainder of the fetus delivered spontaneously  Upon delivery the infant was placed on the mother's abdomen and delayed cord clamping was performed  The umbilical cord was then doubly clamped and cut  The infant was noted to cry spontaneously and was moving all extremities appropriately  There was no evidence for injury  Awaiting nurse resuscitators evaluated the   Arterial and venous cord blood gases and cord blood were collected for analysis and were promptly sent to the lab  In the immediate post-partum, IV pitocin was administered, and the uterus was noted to contract down well with massage and pitocin  The placenta delivered spontaneously at 0737 and was noted to be intact and had a centrally inserted 3 vessel cord  The placenta was sent to pathology  The vagina, cervix, perineum, and rectum were inspected  Second degree laceration were noted  Repair was completed with 3-0 vicryl rapide  At the conclusion of the procedure, all needle, sponge, and instrument counts were noted to be correct  Patient tolerated the procedure well and was allowed to recover in labor and delivery room with family and  before being transferred to the post-partum floor  Dr Melissa Mauricio was present and participated in all key portions of the case      Brii Moralez MD  3/21/2023  2:11 PM

## 2023-03-21 NOTE — OB LABOR/OXYTOCIN SAFETY PROGRESS
Oxytocin Safety Progress Check Note - Sasha Shelter 29 y o  female MRN: [id number]    Unit/Bed#: L&D 322-01 Encounter: [id number]    Dose (yenifer-units/min) Oxytocin: 6 yenifer-units/min  Contraction Frequency (minutes): 3-4  Contraction Quality: Moderate  Tachysystole: No   Cervical Dilation: 3        Cervical Effacement: 70  Fetal Station: -2  Baseline Rate: 155 bpm  Fetal Heart Rate: 150 BPM  FHR Category: Category I               Vital Signs:   Vitals:    03/21/23 0009   BP: 114/70   Pulse: 82   Resp:    Temp:        Notes/comments:   Patient feeling ready for epidural   FHT category 1 with contractions every 2-4 min  Pit running at 6, continue titrating to contractions  SVE 3/70/-2  Will give fluids then call anesthesia for epidural   Will defer AROM until more cervical change is made            Leta Mejia MD 3/21/2023 12:28 AM

## 2023-03-21 NOTE — OB LABOR/OXYTOCIN SAFETY PROGRESS
Oxytocin Safety Progress Check Note - Miguelito Swann 29 y o  female MRN: [id number]    Unit/Bed#: L&D 322-01 Encounter: [id number]    Dose (yenifer-units/min) Oxytocin: 8 yenifer-units/min  Contraction Frequency (minutes): 2-3  Contraction Quality: Moderate  Tachysystole: No   Cervical Dilation: 9        Cervical Effacement: 100  Fetal Station: -1  Baseline Rate: 150 bpm  Fetal Heart Rate: 150 BPM  FHR Category: Category II               Vital Signs:   Vitals:    03/21/23 0530   BP: 128/70   Pulse: 69   Resp:    Temp:        Notes/comments:   Patient resting comfortably  FHT category 2 with intermittent late decelerations  Will attempt maternal repositioning and IVF bolusing  Contractions every 2-3 min  Pit running at 8, decrease to 4  SVE 9/100/-1  Dr Kaylene Dotson and Dr Iris Salamanca aware  Patient requests we defer AROM until her mother arrives          Sharon De Souza MD 3/21/2023 6:48 AM

## 2023-03-21 NOTE — OB LABOR/OXYTOCIN SAFETY PROGRESS
Oxytocin Safety Progress Check Note - Estefani Julian 29 y o  female MRN: [id number]    Unit/Bed#: L&D 322-01 Encounter: [id number]    Dose (yenifer-units/min) Oxytocin: 4 yenifer-units/min  Contraction Frequency (minutes): 2-4  Contraction Quality: Moderate, Mild  Tachysystole: No   Cervical Dilation: 3        Cervical Effacement: 50  Fetal Station: -3  Baseline Rate: 150 bpm  Fetal Heart Rate: 150 BPM  FHR Category: Category I               Vital Signs:   Vitals:    03/20/23 2225   BP: 121/57   Pulse: 70   Resp:    Temp:        Notes/comments:   Patient requesting stadol and does not yet want epidural   FHT category 1 with contractions every 2-4 min  Pit running at 4, continue titrating to contractions  SVE unchanged but medium consistency (was firm at last check)  Will give stadol now and recheck in 2-4 hr or sooner if clinically indicated  Dr Kelli Aaron aware          Vinny Gilbert MD 3/20/2023 10:39 PM

## 2023-03-21 NOTE — OB LABOR/OXYTOCIN SAFETY PROGRESS
Oxytocin Safety Progress Check Note - Shannan Verma 29 y o  female MRN: [id number]    Unit/Bed#: L&D 322-01 Encounter: [id number]    Dose (yenifer-units/min) Oxytocin: 0 yenifer-units/min  Contraction Frequency (minutes): 4  Contraction Quality: Mild  Tachysystole: No   Cervical Dilation: 3        Cervical Effacement: 50  Fetal Station: -3  Baseline Rate: 145 bpm  Fetal Heart Rate: 145 BPM  FHR Category: Category I               Vital Signs:   Vitals:    03/20/23 1911   BP: 138/87   Pulse: 73   Resp:    Temp:        Notes/comments:   Patient feeling stronger contractions every 4 min, consistent w/ toco   FHT category 1 with intermittent early decelerations  Patient ate dinner and took a shower and has been off pitocin for almost 2 hours and is still jane regularly  SVE unchanged  Therefore, cannot give Cytotec as planned  Will re-start pitocin at rate of 2 and increase 2U every 30 min  Will recheck in 2-4 hr or sooner if clinically indicated  Dr Ivana Ordaz aware          Pako Mcneill MD 3/20/2023 9:07 PM

## 2023-03-21 NOTE — ANESTHESIA PREPROCEDURE EVALUATION
Procedure:  LABOR ANALGESIA    Relevant Problems   ANESTHESIA (within normal limits)      GYN   (+) 37 weeks gestation of pregnancy   (+) Encounter for supervision of normal first pregnancy in third trimester      HEMATOLOGY (within normal limits)      PULMONARY  URI 2 weeks ago, COVID+      Other   (+) COVID-19 virus infection        Physical Exam    Airway      TM Distance: >3 FB  Neck ROM: full     Dental       Cardiovascular  Cardiovascular exam normal    Pulmonary  Pulmonary exam normal     Other Findings        Anesthesia Plan  ASA Score- 2     Anesthesia Type- epidural with ASA Monitors  Additional Monitors:   Airway Plan:           Plan Factors-    Chart reviewed  Existing labs reviewed  Patient summary reviewed  Induction-     Postoperative Plan-     Informed Consent- Anesthetic plan and risks discussed with patient

## 2023-03-21 NOTE — UTILIZATION REVIEW
NOTIFICATION OF INPATIENT ADMISSION   MATERNITY/DELIVERY AUTHORIZATION REQUEST   SERVICING FACILITY:   [address] [place] [address] - L&D, , NICU  [address] [facility], [facility], [facility] [address] [facility]  Tax ID: [id number]  NPI: [id number] ATTENDING PROVIDER:  Attending Name and NPI#: Mena Hernandez [[id number]]  Address: [address] [place] [address], [place] [address] [address] [facility]  Phone: [phone]     ADMISSION INFORMATION:  Place of Service: Inpatient [facility]  [address]  Place of Service Code: 21  Inpatient Admission Date/Time: 3/19/23 10:43 PM  Discharge Date/Time: No discharge date for patient encounter  Admitting Diagnosis Code/Description:  Encounter for induction of labor [Z34 90]     Mother: Melvina Vazquez 1990 Estimated Date of Delivery: 4/3/23  Delivering clinician: Mena No    OB History        1    Para   0    Term   0       0    AB   0    Living   0       SAB   0    IAB   0    Ectopic   0    Multiple   0    Live Births   0                Name & MRN:   Information for the patient's :  Glorious Prier Girl Ly Cummins) [[id number]]     [place] Delivery Information:  Sex: female  Delivered 3/21/2023 7:32 AM by ; Gestational Age: 43w4d    [place] Measurements:  Weight: 4 lb 14 5 oz (2225 g); Height:      APGAR 1 minute 5 minutes 10 minutes   Totals: 8 9      [place] Birth Information: 29 y o  female MRN: [id number] Unit/Bed#: L&D 322-01   Birthweight: No birth weight on file  Gestational Age: <None> Delivery Type:    APGARS Totals:        UTILIZATION REVIEW CONTACT:  Rosa Webster Utilization   Network Utilization Review Department  Phone: [phone]  Fax [phone]  Email: [email or web address]  Contact for approvals/pending authorizations, clinical reviews, and discharge       PHYSICIAN ADVISORY SERVICES:  Medical Necessity Denial & Hrpm-he-Cjts Review  Phone: [phone]  Fax: [phone]  Email: [email or web address]

## 2023-03-21 NOTE — DISCHARGE SUMMARY
Obstetrics Discharge Summary  Mariah Hector 29 y o  female MRN: [id number]  Unit/Bed#: L&D 322-01 Encounter: [id number]    Admission Date: 3/19/2023     Discharge Date: 3/23/2023    Admitting Attending: Dr Lenora Linares   Delivery Attending: Dr Toro Cope  Discharging Attending: Dr Geovanna Crockett    Admitting Diagnoses:   Pregnancy at 37w6d gestational age   Intrauterine growth restriction   A1GDM    Discharge Diagnoses:   Same, delivered    Procedures: spontaneous vaginal delivery, repair of second degree laceration     Anesthesia: epidural    Hospital course: Mariah Hector is a 29 y o   at 38wk1d  She presented to labor and delivery for induction of labor secondary to intrauterine growth restriction  Induction was started with misoprostol and gaxiola balloon for cervical ripening and followed with pitocin  At  she progressed to complete cervical dilation and began pushing  On 2023 she delivered a viable female  38w1d via normal spontaneous vaginal delivery  She sustained second degree laceration during delivery  which was adequately repaired  's birth weight was 4lbs 14 5oz; Apgars were 8 (1 min) and 9 (5 min)   was admitted to the  nursery  Patient tolerated the procedure well  Her post-delivery course was uncomplicated  Her postpartum pain was well controlled with oral analgesics  Maternal blood type is O positive  so RhoGAM was not indicated  On day of discharge, she was ambulating and able to reasonably perform all ADLs  She was voiding and had appropriate bowel function  Pain was well controlled  She was discharged home on postpartum day #2 without complications  Patient was instructed to follow up with her OBGYN as an outpatient and was given appropriate warnings to call provider if she develops signs of infection or uncontrolled pain      Complications: none apparent    Condition at discharge: good     Provisions for Follow-Up Care:  Please see after visit summary for information related to follow-up care and any pertinent home health orders  Disposition: Home    Planned Readmission: No    Discharge Medications:   Please see AVS for a complete list of discharge medications  Discharge instructions :   Please see AVS for complete discharge instructions

## 2023-03-22 RX ADMIN — IBUPROFEN 600 MG: 600 TABLET, FILM COATED ORAL at 15:46

## 2023-03-22 RX ADMIN — IBUPROFEN 600 MG: 600 TABLET, FILM COATED ORAL at 21:49

## 2023-03-22 RX ADMIN — IBUPROFEN 600 MG: 600 TABLET, FILM COATED ORAL at 09:03

## 2023-03-22 RX ADMIN — DOCUSATE SODIUM 100 MG: 100 CAPSULE, LIQUID FILLED ORAL at 18:08

## 2023-03-22 RX ADMIN — DOCUSATE SODIUM 100 MG: 100 CAPSULE, LIQUID FILLED ORAL at 09:03

## 2023-03-22 NOTE — UTILIZATION REVIEW
Initial Clinical Review    Admission: Date/Time/Statement:   Admission Orders (From admission, onward)     Ordered        23 2243  Inpatient Admission  Once                      Orders Placed This Encounter   Procedures   • Inpatient Admission     Standing Status:   Standing     Number of Occurrences:   1     Order Specific Question:   Level of Care     Answer:   Med Surg [16]     Order Specific Question:   Estimated length of stay     Answer:   More than 2 Midnights     Order Specific Question:   Certification     Answer:   I certify that inpatient services are medically necessary for this patient for a duration of greater than two midnights  See H&P and MD Progress Notes for additional information about the patient's course of treatment  Chief Complaint   Patient presents with   • Scheduled Induction       Initial Presentation: 29 y o  female presented to L&D as inpatient admission for IOL for FGR   Plan continuous external monitoring, IVF,cytotec gaxiola balloon, IV pitocin if needed Epidural if needed and supportive care    @ 37 6/7 weeks    Baby complications/comments: FGR (37w US with EFW 5lb 6oz 6%, AC <3%, HC <5%)  FHT:  Baseline: 150 bpm  Variability: moderate  Accelerations: present  Decelerations: absent  Category 1     TOCO:   Rare irregular contractions     - 23  @ 0430  2nd cytotec & gaxiola balloon  Contraction Frequency (minutes): N/A  Contraction Quality: Mild  Tachysystole: No   Cervical Dilation: 1  Cervical Effacement: 30  Fetal Station: -3  Baseline Rate: 145 bpm     @ 0930  Contraction Frequency (minutes): 2 5-3  Contraction Quality: Mild  Tachysystole: No   Cervical Dilation: 3  Cervical Effacement: 30  Fetal Station: -3  Baseline Rate: 145 bpm    @1440  IV Pitocin started   Dose (yenifer-units/min) Oxytocin: 2 yenifer-units/min  Contraction Frequency (minutes): 2-6  Contraction Quality: Mild  Tachysystole: No   Cervical Dilation: 3  Cervical Effacement: 50  Fetal Station: -3  Baseline Rate: 145 bpm  Fetal Heart Rate: 140 BPM  FHR Category: Category I    @ 2230  Dose (yenifer-units/min) Oxytocin: 4 yenifer-units/min  Contraction Frequency (minutes): 2-4  Contraction Quality: Moderate, Mild  Tachysystole: No   Cervical Dilation: 3  Cervical Effacement: 50  Fetal Station: -3  Baseline Rate: 150 bpm  Fetal Heart Rate: 150 BPM  FHR Category: Category I    23  @0030  Dose (yenifer-units/min) Oxytocin: 6 yenifer-units/min  Contraction Frequency (minutes): 3-4  Contraction Quality: Moderate  Tachysystole: No   Cervical Dilation: 3  Cervical Effacement: 70  Fetal Station: -2  Baseline Rate: 155 bpm  Fetal Heart Rate: 150 BPM  FHR Category: Category I      @ 0230  S/P epidural  Dose (yenifer-units/min) Oxytocin: 6 yenifer-units/min  Contraction Frequency (minutes): 2-3  Contraction Quality: Moderate  Tachysystole: No   Cervical Dilation: 4  Cervical Effacement: 80  Fetal Station: -1  Baseline Rate: 155 bpm  Fetal Heart Rate: 150 BPM  FHR Category: Category I     @ 0645  Dose (yenifer-units/min) Oxytocin: 8 yenifer-units/min  Contraction Frequency (minutes): 2-3  Contraction Quality: Moderate  Tachysystole: No   Cervical Dilation: 9  Cervical Effacement: 100  Fetal Station: -1  Baseline Rate: 150 bpm  Fetal Heart Rate: 150 BPM  FHR Category: Category II    23    FEMALE @ 0732  APAGR 8/9  WT  2225 GRAMS  BABY TAKEN NBN FOR ROUTINE NEW BORN CARE      Admitting  Vitals   Temperature Pulse Respirations Blood Pressure SpO2   234 23 0300   98 6 °F (37 °C) 65 18 127/71 99 %      Temp Source Heart Rate Source Patient Position - Orthostatic VS BP Location FiO2 (%)   23 0800 23 0800 23 --   Oral Monitor Lying Right arm       Pain Score       23       No Pain          Wt Readings from Last 1 Encounters:   23 68 kg (150 lb)     Additional Vital Signs:     Date/Time Temp Pulse Resp BP MAP (mmHg) SpO2 O2 Device Cardiac (WDL) Patient Position - Orthostatic VS   03/22/23 0700 98 5 °F (36 9 °C) 91 18 116/77 -- -- -- -- --   03/22/23 0300 -- 84 18 121/86 -- 99 % None (Room air) -- Sitting   03/21/23 2300 97 8 °F (36 6 °C) 70 18 123/69 86 -- None (Room air) WDL Sitting   03/21/23 1900 98 4 °F (36 9 °C) 96 18 130/83 101 -- None (Room air) -- Sitting   03/21/23 1500 98 3 °F (36 8 °C) 80 16 118/76 -- -- -- WDL --   03/21/23 1100 -- 86 -- 133/79 -- -- -- -- --   03/21/23 1031 -- 83 -- 129/67 -- -- -- -- --   03/21/23 1000 -- 81 -- 130/75 -- -- -- -- --   03/21/23 0945 -- 87 -- 105/64 -- -- -- -- --   03/21/23 0930 -- 80 -- 119/70 -- -- -- -- --   03/21/23 0915 -- 75 -- 108/79 -- -- -- -- --   03/21/23 0900 -- 78 -- 110/59 -- -- -- -- --   03/21/23 0845 -- 78 -- 114/59 -- -- -- -- --   03/21/23 0830 -- 73 -- 119/68 -- -- -- -- --   03/21/23 0815 -- 74 -- 117/63 -- -- -- -- --   03/21/23 0800 -- 86 -- 118/62 -- -- -- -- --   03/21/23 0745 -- 87 -- 126/67 -- -- -- -- --   03/21/23 0730 -- 96 -- 149/73 -- -- -- -- --   03/21/23 0715 -- 74 -- 123/71 -- -- -- -- --   03/21/23 0700 -- 96 -- 129/81 -- -- -- -- --   03/21/23 0645 -- 89 -- 125/74 -- -- -- -- --   03/21/23 0630 -- 60 -- 129/76 -- -- -- -- --   03/21/23 0615 -- 62 -- 135/74 -- -- -- -- --   03/21/23 0600 -- 74 -- 134/78 -- -- -- -- --   03/21/23 0559 -- 74 -- 134/78 -- -- -- -- --   03/21/23 0546 -- 80 -- 133/76 -- -- -- -- --   03/21/23 0530 -- 69 -- 128/70 -- -- -- -- --   03/21/23 0516 -- 65 -- 114/66 -- -- -- -- --   03/21/23 0500 -- 68 -- 101/58 -- -- -- -- --   03/21/23 0445 97 9 °F (36 6 °C) 85 18 114/58 -- -- -- -- Lying   03/21/23 0430 -- 94 -- 122/72 -- -- -- -- --   03/21/23 0415 -- 76 -- 107/59 -- -- -- -- --   03/21/23 0401 -- 70 -- 107/58 -- -- -- -- --   03/21/23 0345 -- 75 -- 109/57 -- -- -- -- --   03/21/23 0330 -- 77 -- 118/64 -- -- -- -- --   03/21/23 0315 -- 77 -- 108/55 -- -- -- -- --   03/21/23 0300 -- 81 -- 110/59 -- -- -- -- --   03/21/23 0245 -- 78 -- 104/62 -- -- -- -- --   03/21/23 0230 -- 63 -- 117/58 -- -- -- -- --   03/21/23 0216 -- 81 -- 106/57 -- -- -- -- --   03/21/23 0159 -- 62 -- 109/61 -- -- -- -- --   03/21/23 0156 -- 76 -- 110/67 -- -- -- -- --   03/21/23 0153 -- 88 -- 109/60 -- -- -- -- --   03/21/23 0150 -- 67 -- 110/60 -- -- -- -- --   03/21/23 0147 -- 61 -- 111/61 -- -- -- -- --   03/21/23 0144 -- 62 -- 110/62 -- -- -- -- --   03/21/23 0141 -- 60 -- 113/64 -- -- -- -- --   03/21/23 0138 -- 68 -- 115/66 -- -- -- -- --   03/21/23 0135 -- 79 -- 108/62 -- -- -- -- --   03/21/23 0132 -- 69 -- 113/61 -- -- -- -- --   03/21/23 0129 -- 61 -- 111/62 -- -- -- -- --   03/21/23 0126 -- 66 -- 111/58 -- -- -- -- --   03/21/23 0122 -- 61 -- 115/59 -- -- -- -- --   03/21/23 0120 -- 60 -- 114/57 -- -- -- -- --   03/21/23 0117 -- 64 -- 114/55 -- -- -- -- --   03/21/23 0114 -- 63 -- 126/61 -- -- -- -- --   03/21/23 0108 -- 80 -- 121/61 -- -- -- -- --   03/21/23 0040 -- 59 -- 122/66 -- -- -- -- --   03/21/23 0031 97 9 °F (36 6 °C) 60 18 130/66 -- -- -- -- --   03/21/23 0009 -- 82 -- 114/70 -- -- -- -- --   03/20/23 2354 -- 82 -- 132/74 -- -- -- -- --   03/20/23 2339 -- 86 -- 122/73 -- -- -- -- --   03/20/23 2325 -- 103 -- 120/71 -- -- -- -- --   03/20/23 2310 -- 81 -- 120/69 -- -- -- -- --   03/20/23 2254 -- 67 -- 121/69 -- -- -- -- --   03/20/23 2225 -- 70 -- 121/57 -- -- -- -- --   03/20/23 2209 -- 61 -- 105/57 -- -- -- -- --   03/20/23 2154 -- 64 -- 104/58 -- -- -- -- --   03/20/23 2139 -- 67 -- 102/59 -- -- -- -- --   03/20/23 2126 -- 77 -- 113/58 -- -- -- -- --   03/20/23 1911 -- 73 -- 138/87 -- -- -- -- --   03/20/23 1856 -- 64 -- 134/82 -- -- -- -- --   03/20/23 1842 -- 54 Abnormal  -- 140/88 -- -- -- -- --   03/20/23 1826 -- 59 -- 131/70 -- -- -- -- --   03/20/23 1812 -- 78 -- 127/82 -- -- -- -- --   03/20/23 1756 -- 76 -- 127/85 -- -- -- -- --   03/20/23 1741 -- 74 -- 118/78 -- -- -- -- --   03/20/23 1726 -- 82 -- 119/79 -- -- -- -- -- 03/20/23 1711 -- 80 -- 126/72 -- -- -- -- --   03/20/23 1656 -- 75 -- 127/72 -- -- -- -- --   03/20/23 1655 -- 75 -- 127/72 -- -- -- -- --   03/20/23 1641 -- 66 -- 123/71 -- -- -- -- --   03/20/23 1627 -- 56 -- 128/67 -- -- -- -- --   03/20/23 1612 -- 56 -- 142/69 -- -- -- -- --   03/20/23 1556 -- 76 -- 106/56 -- -- -- -- --   03/20/23 1541 -- 74 -- 131/71 -- -- -- -- --   03/20/23 1530 -- -- -- 131/74 -- -- -- -- --   03/20/23 1526 -- 64 -- -- -- -- -- -- --   03/20/23 1511 -- 66 -- 121/70 -- -- -- -- --   03/20/23 1500 -- -- -- 127/76 -- -- -- -- --   03/20/23 1456 -- 71 -- -- -- -- -- -- --   03/20/23 1440 -- 64 -- 121/81 -- -- -- -- --   03/20/23 1214 98 3 °F (36 8 °C) -- -- -- -- -- -- -- --   03/20/23 1200 -- 71 -- 121/67 -- -- -- -- --   03/20/23 1144 -- 88 -- 118/77 -- -- -- -- --   03/20/23 1130 -- 64 -- 118/73 -- -- -- -- --   03/20/23 1114 -- 78 -- 122/69 -- -- -- -- --   03/20/23 1059 -- 71 -- 119/64 -- -- -- -- --   03/20/23 1044 -- 57 -- 117/63 -- -- -- -- --   03/20/23 1029 -- 73 -- 113/60 -- -- -- -- --   03/20/23 1014 -- 62 -- 110/58 -- -- -- -- --   03/20/23 1000 -- -- -- 112/67 -- -- -- -- --   03/20/23 0950 -- 71 -- -- -- -- -- -- --   03/20/23 0800 97 7 °F (36 5 °C) 71 18 121/72 -- -- -- -- Lying   03/20/23 0538 -- 61 -- 101/53 -- -- -- -- --   03/20/23 0523 -- 60 -- 112/62 -- -- -- -- --   03/20/23 7708 -- 58 -- 107/59 -- -- -- -- --   03/20/23 0453 -- 61 -- 103/51 -- -- -- -- --   03/20/23 0438 -- 63 -- 103/58 -- -- -- -- --   03/20/23 0423 -- 62 -- 127/74 -- -- -- -- --   03/20/23 0124 -- 67 -- 91/55 -- -- -- -- --   03/20/23 0109 -- 70 -- 90/52 -- -- -- -- --   03/20/23 0054 -- 71 -- 91/54 -- -- -- -- --   03/20/23 0039 -- 67 -- 112/70 -- -- -- -- --   03/20/23 0024 -- 60 -- 114/70 -- -- -- -- --   03/20/23 0010 -- 68 -- 121/75 -- -- -- --                                    Pertinent Labs/Diagnostic Test Results:   No orders to display         Results from last 7 days   Lab Units 03/19/23  2302   WBC Thousand/uL 10 87*   HEMOGLOBIN g/dL 12 7   HEMATOCRIT % 38 3   PLATELETS Thousands/uL 171     Results from last 7 days   Lab Units 03/21/23  0653 03/21/23  0533 03/21/23  0428 03/21/23  0321 03/21/23  0029 03/20/23  2252 03/20/23  1848 03/20/23  1648 03/20/23  1211 03/20/23  0800 03/20/23  0422 03/19/23  2322   POC GLUCOSE mg/dl 98 110 105 109 96 99 85 99 77 90 76 86     History reviewed  No pertinent past medical history  Present on Admission:  • Diet controlled gestational diabetes mellitus (GDM) in third trimester  • Poor fetal growth affecting management of mother in third trimester      Admitting Diagnosis: Encounter for induction of labor [Z34 90]  Age/Sex: 29 y o  female  Admission Orders:  Scheduled Medications:  docusate sodium, 100 mg, Oral, BID  ibuprofen, 600 mg, Oral, Q6H      Continuous IV Infusions:     PRN Meds:  acetaminophen, 650 mg, Oral, Q4H PRN  benzocaine-menthol-lanolin-aloe, 1 application  , Topical, Q6H PRN  calcium carbonate, 1,000 mg, Oral, Daily PRN  hydrocortisone, 1 application  , Topical, Daily PRN  ondansetron, 4 mg, Intravenous, Q8H PRN  witch hazel-glycerin, 1 pad , Topical, Q4H PRN        None    Network Utilization Review Department  ATTENTION: Please call with any questions or concerns to [phone] and carefully listen to the prompts so that you are directed to the right person  All voicemails are confidential   Robyn Purvis all requests for admission clinical reviews, approved or denied determinations and any other requests to dedicated fax number below belonging to the campus where the patient is receiving treatment   List of dedicated fax numbers for the Facilities:  [address] [place] [address] DENIALS (Administrative/Medical Necessity) [phone]   [address] (Maternity/NICU/Pediatrics) Nicole Hicks [address] [facility] [address] [place] [address] Kyra [place] [facility] [address] [facility] [place] [address] [phone]

## 2023-03-22 NOTE — NURSING NOTE
Discharge education reviewed with patient  Educational packet provided as well as [facility]  Patient verbalized understanding and encouraged to continue to ask questions prior to discharge

## 2023-03-22 NOTE — PLAN OF CARE
Problem: PAIN - ADULT  Goal: Verbalizes/displays adequate comfort level or baseline comfort level  Description: Interventions:  - Encourage patient to monitor pain and request assistance  - Assess pain using appropriate pain scale  - Administer analgesics based on type and severity of pain and evaluate response  - Implement non-pharmacological measures as appropriate and evaluate response  - Consider cultural and social influences on pain and pain management  - Notify physician/advanced practitioner if interventions unsuccessful or patient reports new pain  Outcome: Progressing     Problem: INFECTION - ADULT  Goal: Absence or prevention of progression during hospitalization  Description: INTERVENTIONS:  - Assess and monitor for signs and symptoms of infection  - Monitor lab/diagnostic results  - Monitor all insertion sites, i e  indwelling lines, tubes, and drains  - Monitor endotracheal if appropriate and nasal secretions for changes in amount and color  - [place] appropriate cooling/warming therapies per order  - Administer medications as ordered  - Instruct and encourage patient and family to use good hand hygiene technique  - Identify and instruct in appropriate isolation precautions for identified infection/condition  Outcome: Progressing  Goal: Absence of fever/infection during neutropenic period  Description: INTERVENTIONS:  - Monitor WBC    Outcome: Progressing     Problem: SAFETY ADULT  Goal: Patient will remain free of falls  Description: INTERVENTIONS:  - Educate patient/family on patient safety including physical limitations  - Instruct patient to call for assistance with activity   - Consult OT/PT to assist with strengthening/mobility   - Keep Call bell within reach  - Keep bed low and locked with side rails adjusted as appropriate  - Keep care items and personal belongings within reach  - Initiate and maintain comfort rounds  Outcome: Progressing  Goal: Maintain or return to baseline ADL function  Description: INTERVENTIONS:  -  Assess patient's ability to carry out ADLs; assess patient's baseline for ADL function and identify physical deficits which impact ability to perform ADLs (bathing, care of mouth/teeth, toileting, grooming, dressing, etc )  - Assess/evaluate cause of self-care deficits   - Assess range of motion  - Assess patient's mobility; develop plan if impaired  - Assess patient's need for assistive devices and provide as appropriate  - Encourage maximum independence but intervene and supervise when necessary  - Involve family in performance of ADLs  - Assess for home care needs following discharge   - Consider OT consult to assist with ADL evaluation and planning for discharge  - Provide patient education as appropriate  Outcome: Progressing  Goal: Maintains/Returns to pre admission functional level  Description: INTERVENTIONS:  - Perform BMAT or MOVE assessment daily    - Set and communicate daily mobility goal to care team and patient/family/caregiver  - Collaborate with rehabilitation services on mobility goals if consulted   Outcome: Progressing     Problem: Knowledge Deficit  Goal: Patient/family/caregiver demonstrates understanding of disease process, treatment plan, medications, and discharge instructions  Description: Complete learning assessment and assess knowledge base    Interventions:  - Provide teaching at level of understanding  - Provide teaching via preferred learning methods  Outcome: Progressing     Problem: DISCHARGE PLANNING  Goal: Discharge to home or other facility with appropriate resources  Description: INTERVENTIONS:  - Identify barriers to discharge w/patient and caregiver  - Arrange for needed discharge resources and transportation as appropriate  - Identify discharge learning needs (meds, wound care, etc )  - Arrange for interpretive services to assist at discharge as needed  - Refer to Case Management Department for coordinating discharge planning if the patient needs post-hospital services based on physician/advanced practitioner order or complex needs related to functional status, cognitive ability, or social support system  Outcome: Progressing

## 2023-03-22 NOTE — PROGRESS NOTES
Progress Note - OB/GYN  Sasha Burch 29 y o  female MRN: [id number]  Unit/Bed#: L&D 309-01 Encounter: [id number]    Assessment and Plan     Sasha Burch is PPD#1 from an   *  (spontaneous vaginal delivery)  Assessment & Plan  Continue routine postpartum care  Lactation consult as needed for breastfeeding         Disposition   Anticipate discharge home later today pending baby      Subjective/Objective     Chief Complaint: Postpartum State     Subjective:    Sasha Burch is doing well  Lochia is normal  Voiding appropriately and ambulating  Breastfeeding         Vitals:   /77   Pulse 91   Temp 98 5 °F (36 9 °C)   Resp 18   Ht 5' 2" (1 575 m)   Wt 68 kg (150 lb)   LMP 2022 (Exact Date)   SpO2 99%   Breastfeeding Yes   BMI 27 44 kg/m²       Intake/Output Summary (Last 24 hours) at 3/22/2023 0717  Last data filed at 3/22/2023 0430  Gross per 24 hour   Intake --   Output 2730 ml   Net -2730 ml       Invasive Devices     None                 Physical Exam:   GEN: Sasha Burch appears well, alert and oriented x 3, pleasant and cooperative   RESP:  Breathing comfortably on room air  ABDOMEN: Deferred at this time since patient sitting up and breastfeeding       Labs:     Hemoglobin   Date Value Ref Range Status   2023 12 7 11 5 - 15 4 g/dL Final   2023 12 2 11 5 - 15 4 g/dL Final     WBC   Date Value Ref Range Status   2023 10 87 (H) 4 31 - 10 16 Thousand/uL Final   2023 10 23 (H) 4 31 - 10 16 Thousand/uL Final     Platelets   Date Value Ref Range Status   2023 171 149 - 390 Thousands/uL Final   2023 198 149 - 390 Thousands/uL Final     Creatinine   Date Value Ref Range Status   2023 0 43 (L) 0 60 - 1 30 mg/dL Final     Comment:     Standardized to IDMS reference method   2022 0 66 0 60 - 1 30 mg/dL Final     Comment:     Standardized to IDMS reference method     AST   Date Value Ref Range Status   2023 14 5 - 45 U/L Final Comment:     Specimen collection should occur prior to Sulfasalazine administration due to the potential for falsely depressed results  05/14/2022 21 5 - 45 U/L Final     Comment:     Specimen collection should occur prior to Sulfasalazine administration due to the potential for falsely depressed results  ALT   Date Value Ref Range Status   01/06/2023 24 12 - 78 U/L Final     Comment:     Specimen collection should occur prior to Sulfasalazine and/or Sulfapyridine administration due to the potential for falsely depressed results  05/14/2022 32 12 - 78 U/L Final     Comment:     Specimen collection should occur prior to Sulfasalazine and/or Sulfapyridine administration due to the potential for falsely depressed results             Vee Ovalles MD  3/22/2023  7:17 AM

## 2023-03-22 NOTE — PLAN OF CARE
Problem: PAIN - ADULT  Goal: Verbalizes/displays adequate comfort level or baseline comfort level  Description: Interventions:  - Encourage patient to monitor pain and request assistance  - Assess pain using appropriate pain scale  - Administer analgesics based on type and severity of pain and evaluate response  - Implement non-pharmacological measures as appropriate and evaluate response  - Consider cultural and social influences on pain and pain management  - Notify physician/advanced practitioner if interventions unsuccessful or patient reports new pain  Outcome: Progressing     Problem: INFECTION - ADULT  Goal: Absence or prevention of progression during hospitalization  Description: INTERVENTIONS:  - Assess and monitor for signs and symptoms of infection  - Monitor lab/diagnostic results  - Monitor all insertion sites, i e  indwelling lines, tubes, and drains  - Monitor endotracheal if appropriate and nasal secretions for changes in amount and color  - [place] appropriate cooling/warming therapies per order  - Administer medications as ordered  - Instruct and encourage patient and family to use good hand hygiene technique  - Identify and instruct in appropriate isolation precautions for identified infection/condition  Outcome: Progressing  Goal: Absence of fever/infection during neutropenic period  Description: INTERVENTIONS:  - Monitor WBC    Outcome: Progressing     Problem: SAFETY ADULT  Goal: Patient will remain free of falls  Description: INTERVENTIONS:  - Educate patient/family on patient safety including physical limitations  - Instruct patient to call for assistance with activity   - Consult OT/PT to assist with strengthening/mobility   - Keep Call bell within reach  - Keep bed low and locked with side rails adjusted as appropriate  - Keep care items and personal belongings within reach  - Initiate and maintain comfort rounds  Outcome: Progressing  Goal: Maintain or return to baseline ADL function  Description: INTERVENTIONS:  -  Assess patient's ability to carry out ADLs; assess patient's baseline for ADL function and identify physical deficits which impact ability to perform ADLs (bathing, care of mouth/teeth, toileting, grooming, dressing, etc )  - Assess/evaluate cause of self-care deficits   - Assess range of motion  - Assess patient's mobility; develop plan if impaired  - Assess patient's need for assistive devices and provide as appropriate  - Encourage maximum independence but intervene and supervise when necessary  - Involve family in performance of ADLs  - Assess for home care needs following discharge   - Consider OT consult to assist with ADL evaluation and planning for discharge  - Provide patient education as appropriate  Outcome: Progressing  Goal: Maintains/Returns to pre admission functional level  Description: INTERVENTIONS:  - Perform BMAT or MOVE assessment daily    - Set and communicate daily mobility goal to care team and patient/family/caregiver  - Collaborate with rehabilitation services on mobility goals if consulted  - Ambulate patient 3 times a day  -- Out of bed for meals 3 times a day  - Out of bed for toileting  - Record patient progress and toleration of activity level   Outcome: Progressing     Problem: Knowledge Deficit  Goal: Patient/family/caregiver demonstrates understanding of disease process, treatment plan, medications, and discharge instructions  Description: Complete learning assessment and assess knowledge base    Interventions:  - Provide teaching at level of understanding  - Provide teaching via preferred learning methods  Outcome: Progressing     Problem: DISCHARGE PLANNING  Goal: Discharge to home or other facility with appropriate resources  Description: INTERVENTIONS:  - Identify barriers to discharge w/patient and caregiver  - Arrange for needed discharge resources and transportation as appropriate  - Identify discharge learning needs (meds, wound care, etc )  - Arrange for interpretive services to assist at discharge as needed  - Refer to Case Management Department for coordinating discharge planning if the patient needs post-hospital services based on physician/advanced practitioner order or complex needs related to functional status, cognitive ability, or social support system  Outcome: Progressing     Problem: POSTPARTUM  Goal: Experiences normal postpartum course  Description: INTERVENTIONS:  - Monitor maternal vital signs  - Assess uterine involution and lochia  Outcome: Progressing  Goal: Appropriate maternal -  bonding  Description: INTERVENTIONS:  - Identify family support  - Assess for appropriate maternal/infant bonding   -Encourage maternal/infant bonding opportunities  - Referral to  or  as needed  Outcome: Progressing  Goal: Establishment of infant feeding pattern  Description: INTERVENTIONS:  - Assess breast/bottle feeding  - Refer to lactation as needed  Outcome: Progressing  Goal: Incision(s), wounds(s) or drain site(s) healing without S/S of infection  Description: INTERVENTIONS  - Assess and document dressing, incision, wound bed, drain sites and surrounding tissue  - Provide patient and family education  - Perform skin care/dressing changes every day  Outcome: Progressing

## 2023-03-22 NOTE — LACTATION NOTE
This note was copied from a baby's chart  Mom asked for assistance with deep latch & try SNS system at breast to supplement as ordered  Mom wanted to start on right breast using football hold  SNS with Donor Milk at breast  Baby guided to deeper latch  Stimulated to keep suckling  Short latches with small high pallet  Stimulated to keep sucking till popped off  Burped  Then to left breast also using football hold  Better suckling on this side but baby having trouble sucking hard enough to pull from syringe  Standard SNS at bedside to try later  Mom seemed pleased with teaching  Dad remains at bedside  03/22/23 1101   LATCH Documentation   Latch 2  (working on consistent deep latch)   Audible Swallowing 1   Type of Nipple 2   Comfort (Breast/Nipple) 2   Hold (Positioning) 1   LATCH Score 8   Having latch problems? No  (working on consistent deep latch)   Position(s) Used Football   Breasts/Nipples   Left Breast Soft   Right Breast Soft   Left Nipple Everted   Right Nipple Everted   Intervention Hand expression   Breastfeeding Status Yes   Breastfeeding Progress Not yet established   Reasons for not Breastfeeding Infant medical condition  (Baby SGA with low blood sugar)   Other OB Lactation Tools   Feeding [address] (SNS); Bottle     Encouraged parents to call for assistance, questions, and concerns about breastfeeding  Extension provided

## 2023-03-22 NOTE — PLAN OF CARE
Problem: Knowledge Deficit  Goal: Verbalizes understanding of labor plan  Description: Assess patient/family/caregiver's baseline knowledge level and ability to understand information  Provide education via patient/family/caregiver's preferred learning method at appropriate level of understanding  1  Provide teaching at level of understanding  2  Provide teaching via preferred learning method(s)   3/21/2023 2237 by Amaury Egan RN  Outcome: Completed     Problem: Labor & Delivery  Goal: Manages discomfort  Description: Assess and monitor for signs and symptoms of discomfort  Assess patient's pain level regularly and per hospital policy  Administer medications as ordered  Support use of nonpharmacological methods to help control pain such as distraction, imagery, relaxation, and application of heat and cold  Collaborate with interdisciplinary team and patient to determine appropriate pain management plan  1  Include patient in decisions related to comfort  2  Offer non-pharmacological pain management interventions  3  Report ineffective pain management to physician   3/21/2023 2237 by Amaury Egan RN  Outcome: Completed  Goal: Patient vital signs are stable  Description: 1  Assess vital signs - vaginal delivery    3/21/2023 2237 by Amaury Egan RN  Outcome: Completed

## 2023-03-23 RX ORDER — DOCUSATE SODIUM 100 MG/1
100 CAPSULE, LIQUID FILLED ORAL 2 TIMES DAILY
Refills: 0
Start: 2023-03-23

## 2023-03-23 RX ORDER — IBUPROFEN 600 MG/1
600 TABLET ORAL EVERY 6 HOURS
Qty: 30 TABLET | Refills: 0
Start: 2023-03-23

## 2023-03-23 RX ADMIN — WITCH HAZEL 1 PAD.: 500 SOLUTION RECTAL; TOPICAL at 15:55

## 2023-03-23 RX ADMIN — DOCUSATE SODIUM 100 MG: 100 CAPSULE, LIQUID FILLED ORAL at 18:01

## 2023-03-23 RX ADMIN — IBUPROFEN 600 MG: 600 TABLET, FILM COATED ORAL at 03:35

## 2023-03-23 RX ADMIN — BENZOCAINE AND LEVOMENTHOL 1 APPLICATION.: 200; 5 SPRAY TOPICAL at 15:54

## 2023-03-23 RX ADMIN — IBUPROFEN 600 MG: 600 TABLET, FILM COATED ORAL at 09:31

## 2023-03-23 RX ADMIN — IBUPROFEN 600 MG: 600 TABLET, FILM COATED ORAL at 15:54

## 2023-03-23 RX ADMIN — DOCUSATE SODIUM 100 MG: 100 CAPSULE, LIQUID FILLED ORAL at 09:33

## 2023-03-23 NOTE — PROGRESS NOTES
Progress Note - OB/GYN   Chastity Lazo 29 y o  female MRN: [id number]  Unit/Bed#: L&D 309-01 Encounter: [id number]    Assessment:  Post partum Day #1 s/p , stable, baby in room    Plan:  *  (spontaneous vaginal delivery)  Assessment & Plan  Continue routine postpartum care  Lactation consult as needed for breastfeeding     Diet controlled gestational diabetes mellitus (GDM) in third trimester  Assessment & Plan  2h gtt at 6wk postpartum      Subjective/Objective   Chief Complaint:     Post delivery  Patient is doing well  Lochia WNL  Pain well controlled  Subjective:     Pain: yes, cramping, improved with meds  Tolerating PO: yes  Voiding: yes  Flatus: yes  BM: yes  Ambulating: yes  Chest pain: no  Shortness of breath: no  Leg pain: no  Lochia: minimal    Objective:     Vitals: /81 (BP Location: Left arm)   Pulse 100   Temp 98 3 °F (36 8 °C) (Oral)   Resp 18   Ht 5' 2" (1 575 m)   Wt 68 kg (150 lb)   LMP 2022 (Exact Date)   SpO2 98%   Breastfeeding Yes   BMI 27 44 kg/m²     No intake or output data in the 24 hours ending 23 0715    Lab Results   Component Value Date    WBC 10 87 (H) 2023    HGB 12 7 2023    HCT 38 3 2023    MCV 90 2023     2023       Physical Exam:     Gen: AAOx3, NAD  CV: RRR  Lungs: CTA b/l  Abd: Soft, non-tender, non-distended, no rebound or guarding  Uterine fundus firm and non-tender, 2 cm below the umbilicus     Ext: Non tender    Iglesia Plata MD  3/23/2023  7:15 AM

## 2023-03-23 NOTE — PLAN OF CARE
Problem: PAIN - ADULT  Goal: Verbalizes/displays adequate comfort level or baseline comfort level  Description: Interventions:  - Encourage patient to monitor pain and request assistance  - Assess pain using appropriate pain scale  - Administer analgesics based on type and severity of pain and evaluate response  - Implement non-pharmacological measures as appropriate and evaluate response  - Consider cultural and social influences on pain and pain management  - Notify physician/advanced practitioner if interventions unsuccessful or patient reports new pain  Outcome: Progressing     Problem: INFECTION - ADULT  Goal: Absence or prevention of progression during hospitalization  Description: INTERVENTIONS:  - Assess and monitor for signs and symptoms of infection  - Monitor lab/diagnostic results  - Monitor all insertion sites, i e  indwelling lines, tubes, and drains  - Monitor endotracheal if appropriate and nasal secretions for changes in amount and color  - [place] appropriate cooling/warming therapies per order  - Administer medications as ordered  - Instruct and encourage patient and family to use good hand hygiene technique  - Identify and instruct in appropriate isolation precautions for identified infection/condition  Outcome: Progressing  Goal: Absence of fever/infection during neutropenic period  Description: INTERVENTIONS:  - Monitor WBC    Outcome: Progressing     Problem: SAFETY ADULT  Goal: Patient will remain free of falls  Description: INTERVENTIONS:  - Educate patient/family on patient safety including physical limitations  - Instruct patient to call for assistance with activity   - Consult OT/PT to assist with strengthening/mobility   - Keep Call bell within reach  - Keep bed low and locked with side rails adjusted as appropriate  - Keep care items and personal belongings within reach  - Initiate and maintain comfort rounds  -Outcome: Progressing  Goal: Maintain or return to baseline ADL function  Description: INTERVENTIONS:  -  Assess patient's ability to carry out ADLs; assess patient's baseline for ADL function and identify physical deficits which impact ability to perform ADLs (bathing, care of mouth/teeth, toileting, grooming, dressing, etc )  - Assess/evaluate cause of self-care deficits   - Assess range of motion  - Assess patient's mobility; develop plan if impaired  - Assess patient's need for assistive devices and provide as appropriate  - Encourage maximum independence but intervene and supervise when necessary  - Involve family in performance of ADLs  - Assess for home care needs following discharge   - Consider OT consult to assist with ADL evaluation and planning for discharge  - Provide patient education as appropriate  Outcome: Progressing  Goal: Maintains/Returns to pre admission functional level  Description: INTERVENTIONS:  - Perform BMAT or MOVE assessment daily    - Set and communicate daily mobility goal to care team and patient/family/caregiver  - Collaborate with rehabilitation services on mobility goals if consulted  - Ambulate patient 3 times a day     - Out of bed for meals 3 times a day  - Out of bed for toileting  - Record patient progress and toleration of activity level   Outcome: Progressing     Problem: Knowledge Deficit  Goal: Patient/family/caregiver demonstrates understanding of disease process, treatment plan, medications, and discharge instructions  Description: Complete learning assessment and assess knowledge base    Interventions:  - Provide teaching at level of understanding  - Provide teaching via preferred learning methods  Outcome: Progressing     Problem: DISCHARGE PLANNING  Goal: Discharge to home or other facility with appropriate resources  Description: INTERVENTIONS:  - Identify barriers to discharge w/patient and caregiver  - Arrange for needed discharge resources and transportation as appropriate  - Identify discharge learning needs (meds, wound care, etc )  - Arrange for interpretive services to assist at discharge as needed  - Refer to Case Management Department for coordinating discharge planning if the patient needs post-hospital services based on physician/advanced practitioner order or complex needs related to functional status, cognitive ability, or social support system  Outcome: Progressing     Problem: POSTPARTUM  Goal: Experiences normal postpartum course  Description: INTERVENTIONS:  - Monitor maternal vital signs  - Assess uterine involution and lochia  Outcome: Progressing  Goal: Appropriate maternal -  bonding  Description: INTERVENTIONS:  - Identify family support  - Assess for appropriate maternal/infant bonding   -Encourage maternal/infant bonding opportunities  - Referral to  or  as needed  Outcome: Progressing  Goal: Establishment of infant feeding pattern  Description: INTERVENTIONS:  - Assess breast/bottle feeding  - Refer to lactation as needed  Outcome: Progressing  Goal: Incision(s), wounds(s) or drain site(s) healing without S/S of infection  Description: INTERVENTIONS  - Assess and document dressing, incision, wound bed, drain sites and surrounding tissue  - Provide patient and family education  - Perform skin care/dressing changes every day  Outcome: Progressing

## 2023-03-23 NOTE — LACTATION NOTE
This note was copied from a baby's chart  Mother verbalized breastfeeding is going well  States she prefers to bottle fed Donor breastmilk after feeding at breast  States if baby still seems hungry she puts baby back to breast   Mom's plan is to take some Donor milk Home as [employer]  Set aside as listed  03/23/23 1015   Feeding Status   Breastfeeding Status Yes   Breastfeeding? Yes   Feeding Type Breast milk   Donor Breast Milk Batch # Donor Breastmilk for Home use  (#1: [id number] ( 14) #2: 865927-1 (16) #3: 720949-3(45) #4: 094881-9(23) #5: 373179-7(52) #6: 654337-6(50) #7: 727626-3(16))   Fed by Mother;Staff   Breast Milk Feeding Breast;Expressed Breast MIlk   Breastfeeding Occurrence   Breastfeeding Occurrence  1   Feeding Devices Pump; Bottle    References # [id number]                         # [id number]      Enc to call for assistance as needed,phone # given  Dad remains supportive at bedside

## 2023-03-24 VITALS
BODY MASS INDEX: 27.6 KG/M2 | DIASTOLIC BLOOD PRESSURE: 55 MMHG | TEMPERATURE: 96.2 F | HEART RATE: 94 BPM | HEIGHT: 62 IN | WEIGHT: 150 LBS | RESPIRATION RATE: 18 BRPM | SYSTOLIC BLOOD PRESSURE: 108 MMHG | OXYGEN SATURATION: 97 %

## 2023-03-24 NOTE — PLAN OF CARE
Problem: PAIN - ADULT  Goal: Verbalizes/displays adequate comfort level or baseline comfort level  Description: Interventions:  - Encourage patient to monitor pain and request assistance  - Assess pain using appropriate pain scale  - Administer analgesics based on type and severity of pain and evaluate response  - Implement non-pharmacological measures as appropriate and evaluate response  - Consider cultural and social influences on pain and pain management  - Notify physician/advanced practitioner if interventions unsuccessful or patient reports new pain  3/24/2023 0051 by Alec Mobley RN  Outcome: Completed  3/23/2023 2246 by Alec Mobley RN  Outcome: Progressing     Problem: INFECTION - ADULT  Goal: Absence or prevention of progression during hospitalization  Description: INTERVENTIONS:  - Assess and monitor for signs and symptoms of infection  - Monitor lab/diagnostic results  - Monitor all insertion sites, i e  indwelling lines, tubes, and drains  - Monitor endotracheal if appropriate and nasal secretions for changes in amount and color  - [place] appropriate cooling/warming therapies per order  - Administer medications as ordered  - Instruct and encourage patient and family to use good hand hygiene technique  - Identify and instruct in appropriate isolation precautions for identified infection/condition  3/24/2023 0051 by Alec Mobley RN  Outcome: Completed  3/23/2023 2246 by Alec Mobley RN  Outcome: Progressing  Goal: Absence of fever/infection during neutropenic period  Description: INTERVENTIONS:  - Monitor WBC    3/24/2023 0051 by Alec Mobley RN  Outcome: Completed  3/23/2023 2246 by Alec Mobley RN  Outcome: Progressing     Problem: SAFETY ADULT  Goal: Patient will remain free of falls  Description: INTERVENTIONS:  - Educate patient/family on patient safety including physical limitations  - Instruct patient to call for assistance with activity   - Consult OT/PT to assist with strengthening/mobility   - Keep Call bell within reach  - Keep bed low and locked with side rails adjusted as appropriate  - Keep care items and personal belongings within reach  - Initiate and maintain comfort rounds  - Make Fall Risk Sign visible to staff  3/24/2023 0051 by Michelle Yo RN  Outcome: Completed  3/23/2023 2246 by Michelle Yo RN  Outcome: Progressing  Goal: Maintain or return to baseline ADL function  Description: INTERVENTIONS:  -  Assess patient's ability to carry out ADLs; assess patient's baseline for ADL function and identify physical deficits which impact ability to perform ADLs (bathing, care of mouth/teeth, toileting, grooming, dressing, etc )  - Assess/evaluate cause of self-care deficits   - Assess range of motion  - Assess patient's mobility; develop plan if impaired  - Assess patient's need for assistive devices and provide as appropriate  - Encourage maximum independence but intervene and supervise when necessary  - Involve family in performance of ADLs  - Assess for home care needs following discharge   - Consider OT consult to assist with ADL evaluation and planning for discharge  - Provide patient education as appropriate  3/24/2023 0051 by Michelle Yo RN  Outcome: Completed  3/23/2023 2246 by Michelle Yo RN  Outcome: Progressing  Goal: Maintains/Returns to pre admission functional level  Description: INTERVENTIONS:  - Perform BMAT or MOVE assessment daily    - Set and communicate daily mobility goal to care team and patient/family/caregiver     - Collaborate with rehabilitation services on mobility goals if consulted  3/24/2023 0051 by Michelle Yo RN  Outcome: Completed  3/23/2023 2246 by Michelle Yo RN  Outcome: Progressing     Problem: Knowledge Deficit  Goal: Patient/family/caregiver demonstrates understanding of disease process, treatment plan, medications, and discharge instructions  Description: Complete learning assessment and assess knowledge base   Interventions:  - Provide teaching at level of understanding  - Provide teaching via preferred learning methods  3/24/2023 0051 by Bret Mendieta RN  Outcome: Completed  3/23/2023 2246 by Bret Mendieta RN  Outcome: Progressing     Problem: DISCHARGE PLANNING  Goal: Discharge to home or other facility with appropriate resources  Description: INTERVENTIONS:  - Identify barriers to discharge w/patient and caregiver  - Arrange for needed discharge resources and transportation as appropriate  - Identify discharge learning needs (meds, wound care, etc )  - Arrange for interpretive services to assist at discharge as needed  - Refer to Case Management Department for coordinating discharge planning if the patient needs post-hospital services based on physician/advanced practitioner order or complex needs related to functional status, cognitive ability, or social support system  3/24/2023 0051 by Bret Mendieta RN  Outcome: Completed  3/23/2023 2246 by Bret Mendieta RN  Outcome: Progressing     Problem: POSTPARTUM  Goal: Experiences normal postpartum course  Description: INTERVENTIONS:  - Monitor maternal vital signs  - Assess uterine involution and lochia  3/24/2023 0051 by Bret Mendieta RN  Outcome: Completed  3/23/2023 2246 by Bret Mendieta RN  Outcome: Progressing  Goal: Appropriate maternal -  bonding  Description: INTERVENTIONS:  - Identify family support  - Assess for appropriate maternal/infant bonding   -Encourage maternal/infant bonding opportunities  - Referral to  or  as needed  3/24/2023 0051 by Bret Mendieta RN  Outcome: Completed  3/23/2023 2246 by Bret Mendieta RN  Outcome: Progressing  Goal: Establishment of infant feeding pattern  Description: INTERVENTIONS:  - Assess breast/bottle feeding  - Refer to lactation as needed  3/24/2023 0051 by Bret Mendieta RN  Outcome: Completed  3/23/2023 2246 by Bret Mendieta RN  Outcome: Progressing  Goal: Incision(s), wounds(s) or drain site(s) healing without S/S of infection  Description: INTERVENTIONS  - Assess and document dressing, incision, wound bed, drain sites and surrounding tissue  - Provide patient and family education  3/24/2023 0051 by Verner Panning, RN  Outcome: Completed  3/23/2023 2246 by Verner Panning, RN  Outcome: Progressing

## 2023-03-24 NOTE — PLAN OF CARE
Problem: PAIN - ADULT  Goal: Verbalizes/displays adequate comfort level or baseline comfort level  Description: Interventions:  - Encourage patient to monitor pain and request assistance  - Assess pain using appropriate pain scale  - Administer analgesics based on type and severity of pain and evaluate response  - Implement non-pharmacological measures as appropriate and evaluate response  - Consider cultural and social influences on pain and pain management  - Notify physician/advanced practitioner if interventions unsuccessful or patient reports new pain  Outcome: Progressing     Problem: INFECTION - ADULT  Goal: Absence or prevention of progression during hospitalization  Description: INTERVENTIONS:  - Assess and monitor for signs and symptoms of infection  - Monitor lab/diagnostic results  - Monitor all insertion sites, i e  indwelling lines, tubes, and drains  - Monitor endotracheal if appropriate and nasal secretions for changes in amount and color  - [place] appropriate cooling/warming therapies per order  - Administer medications as ordered  - Instruct and encourage patient and family to use good hand hygiene technique  - Identify and instruct in appropriate isolation precautions for identified infection/condition  Outcome: Progressing  Goal: Absence of fever/infection during neutropenic period  Description: INTERVENTIONS:  - Monitor WBC    Outcome: Progressing     Problem: SAFETY ADULT  Goal: Patient will remain free of falls  Description: INTERVENTIONS:  - Educate patient/family on patient safety including physical limitations  - Instruct patient to call for assistance with activity   - Consult OT/PT to assist with strengthening/mobility   - Keep Call bell within reach  - Keep bed low and locked with side rails adjusted as appropriate  - Keep care items and personal belongings within reach  - Initiate and maintain comfort rounds  Outcome: Progressing  Goal: Maintain or return to baseline ADL function  Description: INTERVENTIONS:  -  Assess patient's ability to carry out ADLs; assess patient's baseline for ADL function and identify physical deficits which impact ability to perform ADLs (bathing, care of mouth/teeth, toileting, grooming, dressing, etc )  - Assess/evaluate cause of self-care deficits   - Assess range of motion  - Assess patient's mobility; develop plan if impaired  - Assess patient's need for assistive devices and provide as appropriate  - Encourage maximum independence but intervene and supervise when necessary  - Involve family in performance of ADLs  - Assess for home care needs following discharge   - Consider OT consult to assist with ADL evaluation and planning for discharge  - Provide patient education as appropriate  Outcome: Progressing  Goal: Maintains/Returns to pre admission functional level  Description: INTERVENTIONS:  - Perform BMAT or MOVE assessment daily    - Set and communicate daily mobility goal to care team and patient/family/caregiver  - Collaborate with rehabilitation services on mobility goals if consulted  Outcome: Progressing     Problem: Knowledge Deficit  Goal: Patient/family/caregiver demonstrates understanding of disease process, treatment plan, medications, and discharge instructions  Description: Complete learning assessment and assess knowledge base    Interventions:  - Provide teaching at level of understanding  - Provide teaching via preferred learning methods  Outcome: Progressing     Problem: DISCHARGE PLANNING  Goal: Discharge to home or other facility with appropriate resources  Description: INTERVENTIONS:  - Identify barriers to discharge w/patient and caregiver  - Arrange for needed discharge resources and transportation as appropriate  - Identify discharge learning needs (meds, wound care, etc )  - Arrange for interpretive services to assist at discharge as needed  - Refer to Case Management Department for coordinating discharge planning if the patient needs post-hospital services based on physician/advanced practitioner order or complex needs related to functional status, cognitive ability, or social support system  Outcome: Progressing     Problem: POSTPARTUM  Goal: Experiences normal postpartum course  Description: INTERVENTIONS:  - Monitor maternal vital signs  - Assess uterine involution and lochia  Outcome: Progressing  Goal: Appropriate maternal -  bonding  Description: INTERVENTIONS:  - Identify family support  - Assess for appropriate maternal/infant bonding   -Encourage maternal/infant bonding opportunities  - Referral to  or  as needed  Outcome: Progressing  Goal: Establishment of infant feeding pattern  Description: INTERVENTIONS:  - Assess breast/bottle feeding  - Refer to lactation as needed  Outcome: Progressing  Goal: Incision(s), wounds(s) or drain site(s) healing without S/S of infection  Description: INTERVENTIONS  - Assess and document dressing, incision, wound bed, drain sites and surrounding tissue  - Provide patient and family education  Outcome: Progressing

## 2023-03-27 ENCOUNTER — TRANSITIONAL CARE MANAGEMENT (OUTPATIENT)
Dept: INTERNAL MEDICINE CLINIC | Facility: CLINIC | Age: 33
End: 2023-03-27

## 2023-03-28 DIAGNOSIS — Z86.32 HISTORY OF GESTATIONAL DIABETES MELLITUS (GDM), NOT CURRENTLY PREGNANT: ICD-10-CM

## 2023-03-28 DIAGNOSIS — Z13.1 DIABETES MELLITUS SCREENING: Primary | ICD-10-CM

## 2023-03-28 NOTE — PROGRESS NOTES
Ambulatory Order    · Lab Ordered: 2hr (75GM) GTT  · Reason: to screen for T2DM s/p delivery r/t H/O GDM  · Time-Frame to be collected: 4-12 weeks postpartum    Patient Instructions: Fasting = Do NOT eat or drink anything except WATER for at least 8 hours before the test     *For [facility], please call [phone] to schedule     *To request lab be sent to [facility], Call: [phone] or Message Diabetes Team via [employer] to [facility] [place], [place]   Diabetes Educator   [facility] - Maternal Fetal Medicine  Diabetes and Pregnancy Program

## 2023-05-06 PROBLEM — O36.5930 POOR FETAL GROWTH AFFECTING MANAGEMENT OF MOTHER IN THIRD TRIMESTER: Status: RESOLVED | Noted: 2023-03-16 | Resolved: 2023-05-06

## 2023-05-06 PROBLEM — Z3A.37 37 WEEKS GESTATION OF PREGNANCY: Status: RESOLVED | Noted: 2022-08-12 | Resolved: 2023-05-06

## 2023-05-06 PROBLEM — Z34.03 ENCOUNTER FOR SUPERVISION OF NORMAL FIRST PREGNANCY IN THIRD TRIMESTER: Status: RESOLVED | Noted: 2022-08-31 | Resolved: 2023-05-06

## 2023-05-06 PROBLEM — U07.1 COVID-19 VIRUS INFECTION: Status: RESOLVED | Noted: 2023-03-09 | Resolved: 2023-05-06

## 2023-05-07 NOTE — PROGRESS NOTES
"Subjective       Chief Complaint   Patient presents with   • Postpartum Care     Ppd score 6  3/21 \"Janett\"   Pt with c/o possible clogged milk duct in left breast        Toshia Amador is a 29 y o  female who presents for a postpartum visit  She is 6 weeks postpartum following a spontaneous vaginal delivery  I have fully reviewed the prenatal and intrapartum course  The delivery was at 38 1 gestational weeks  Outcome: spontaneous vaginal delivery  Anesthesia: epidural  Postpartum course has been well  Baby's course has been normal  Baby is feeding by breast  Bleeding no bleeding  Bowel function is normal  Bladder function is normal  Patient is not sexually active  Contraception method is condoms  Postpartum depression screening: negative  C/o low grade t on the L  No h/o mastitisemp 100 4  Clogged milk duct        The following portions of the patient's history were reviewed and updated as appropriate: allergies, current medications, past family history, past medical history, past social history, past surgical history and problem list     Postpartum Depression: Low Risk    • Last EPDS Total Score: 2   • Last EPDS Self Harm Result: Never       Last PAP: 10/22/21  GDM:  yes      Review of Systems    Physical Exam  Constitutional:       Appearance: Normal appearance  Genitourinary:      Genitourinary Comments: Redness    Breasts:     Right: Normal  No swelling, bleeding, inverted nipple, mass, nipple discharge, skin change or tenderness  Left: Skin change and tenderness present  No bleeding, inverted nipple, mass or nipple discharge  HENT:      Head: Normocephalic  Nose: Nose normal    Pulmonary:      Effort: Pulmonary effort is normal    Chest:      Chest wall: No mass, lacerations, deformity, swelling, tenderness, crepitus or edema  Abdominal:      General: There is no distension  Palpations: Abdomen is soft  Tenderness: There is no abdominal tenderness     Lymphadenopathy:     " " Upper Body:      Right upper body: No supraclavicular or axillary adenopathy  Left upper body: No supraclavicular or axillary adenopathy  Neurological:      General: No focal deficit present  Mental Status: She is alert and oriented to person, place, and time  Skin:     General: Skin is warm and dry  Psychiatric:         Mood and Affect: Mood normal          Behavior: Behavior normal          Thought Content: Thought content normal    Vitals reviewed  Objective     /70 (BP Location: Right arm, Patient Position: Sitting, Cuff Size: Adult)   Pulse 95   Temp 99 1 °F (37 3 °C) (Tympanic)   Ht 5' 2\" (1 575 m)   Wt 62 1 kg (137 lb)   Breastfeeding Yes   BMI 25 06 kg/m²    General:  alert and oriented, in no acute distress   Abdomen: soft, non-tender; bowel sounds normal; no masses,  no organomegaly    Vulva:  normal   Vagina: normal vagina, no discharge, exudate, lesion, or erythema   Cervix:  no cervical motion tenderness   Corpus: normal size, contour, position, consistency, mobility, non-tender   Adnexa:  no mass, fullness, tenderness       Assessment/Plan     6 weeks  postpartum exam      1  Contraception: condoms  2  2 hour GTT WHITNEY  3  Follow up in: 5 months or as needed  4  Patient diagnosed with mastitis  I advised NSAIDs for pain relief  Advised to increase hydration  I advised to continue breast-feeding and to start with a sore breast   Advised massaging the breast to clear any blockages  Advised patient oral antibiotics to complete with typical resolution within 2 to 3 days  I advised follow-up after to check for clinical improvement  Advised patient to call if with recurrent fevers or chills, severe nausea or vomiting, lightheadedness or dizziness or inability to tolerate oral antibiotics  Advised to call if no improvement  Offered PT for blocked milk if recurrent issue    5  Advised to use lubricants during intercourse, due to breastfeeding status  6   Advised call " for return to work to note      Problem List Items Addressed This Visit        Endocrine    Gestational diabetes mellitus (GDM), delivered       Other     (spontaneous vaginal delivery)   Other Visit Diagnoses     Postpartum care and examination    -  Primary    Nonpurulent mastitis associated with lactation        Relevant Medications    cephalexin (KEFLEX) 500 mg capsule

## 2023-05-09 ENCOUNTER — POSTPARTUM VISIT (OUTPATIENT)
Dept: OBGYN CLINIC | Facility: CLINIC | Age: 33
End: 2023-05-09

## 2023-05-09 VITALS
HEART RATE: 95 BPM | DIASTOLIC BLOOD PRESSURE: 70 MMHG | SYSTOLIC BLOOD PRESSURE: 118 MMHG | BODY MASS INDEX: 25.21 KG/M2 | HEIGHT: 62 IN | TEMPERATURE: 99.1 F | WEIGHT: 137 LBS

## 2023-05-09 DIAGNOSIS — O91.23 NONPURULENT MASTITIS ASSOCIATED WITH LACTATION: ICD-10-CM

## 2023-05-09 RX ORDER — CEPHALEXIN 500 MG/1
500 CAPSULE ORAL EVERY 6 HOURS SCHEDULED
Qty: 28 CAPSULE | Refills: 0 | Status: SHIPPED | OUTPATIENT
Start: 2023-05-09 | End: 2023-05-16

## 2023-05-10 NOTE — ANESTHESIA POSTPROCEDURE EVALUATION
Patient requesting refills on  Ambien 5MG. Dr. Díaz who is her hip doctor prescribed medication but was advised if she needed additional refills to contact her PCP for refills.    Please call patient if medication will be prescribe.    Post-Op Assessment Note    CV Status:  Stable    Pain management: adequate     Mental Status:  Alert and awake   Hydration Status:  Euvolemic   PONV Controlled:  Controlled   Airway Patency:  Patent      Post Op Vitals Reviewed: Yes        Post-op block assessment: adhesive bandage applied, catheter intact and no complications      No notable events documented      /68 (03/21/23 0830)    Temp      Pulse 73 (03/21/23 0830)   Resp      SpO2

## 2023-06-01 ENCOUNTER — TELEPHONE (OUTPATIENT)
Dept: OBGYN CLINIC | Facility: CLINIC | Age: 33
End: 2023-06-01

## 2023-06-01 ENCOUNTER — DOCUMENTATION (OUTPATIENT)
Dept: OBGYN CLINIC | Facility: CLINIC | Age: 33
End: 2023-06-01

## 2023-06-01 NOTE — TELEPHONE ENCOUNTER
Patient called and stated that she was advised to call the office when she was ready for the return to work note, states she goes back on June 13th and needs it dated to June 12th is this okay for us to do?

## 2023-06-21 ENCOUNTER — APPOINTMENT (OUTPATIENT)
Dept: LAB | Facility: HOSPITAL | Age: 33
End: 2023-06-21
Payer: COMMERCIAL

## 2023-06-21 DIAGNOSIS — Z13.1 DIABETES MELLITUS SCREENING: ICD-10-CM

## 2023-06-21 DIAGNOSIS — Z86.32 HISTORY OF GESTATIONAL DIABETES MELLITUS (GDM), NOT CURRENTLY PREGNANT: ICD-10-CM

## 2023-06-21 LAB
GLUCOSE 2H P 75 G GLC PO SERPL-MCNC: 121 MG/DL (ref 70–139)
GLUCOSE P FAST SERPL-MCNC: 95 MG/DL (ref 65–99)

## 2023-06-21 PROCEDURE — 82947 ASSAY GLUCOSE BLOOD QUANT: CPT

## 2023-06-21 PROCEDURE — 82950 GLUCOSE TEST: CPT

## 2023-06-21 PROCEDURE — 36415 COLL VENOUS BLD VENIPUNCTURE: CPT

## 2023-09-12 ENCOUNTER — APPOINTMENT (OUTPATIENT)
Dept: LAB | Facility: MEDICAL CENTER | Age: 33
End: 2023-09-12

## 2023-09-12 DIAGNOSIS — Z00.8 HEALTH EXAMINATION IN POPULATION SURVEYS: ICD-10-CM

## 2023-09-12 LAB
CHOLEST SERPL-MCNC: 185 MG/DL
EST. AVERAGE GLUCOSE BLD GHB EST-MCNC: 111 MG/DL
HBA1C MFR BLD: 5.5 %
HDLC SERPL-MCNC: 80 MG/DL
LDLC SERPL CALC-MCNC: 88 MG/DL (ref 0–100)
NONHDLC SERPL-MCNC: 105 MG/DL
TRIGL SERPL-MCNC: 84 MG/DL

## 2023-09-12 PROCEDURE — 83036 HEMOGLOBIN GLYCOSYLATED A1C: CPT

## 2023-09-12 PROCEDURE — 80061 LIPID PANEL: CPT

## 2023-09-12 PROCEDURE — 36415 COLL VENOUS BLD VENIPUNCTURE: CPT

## 2024-08-12 ENCOUNTER — ANNUAL EXAM (OUTPATIENT)
Dept: OBGYN CLINIC | Facility: CLINIC | Age: 34
End: 2024-08-12
Payer: COMMERCIAL

## 2024-08-12 VITALS
BODY MASS INDEX: 25.43 KG/M2 | DIASTOLIC BLOOD PRESSURE: 62 MMHG | SYSTOLIC BLOOD PRESSURE: 102 MMHG | WEIGHT: 138.2 LBS | HEIGHT: 62 IN | HEART RATE: 78 BPM

## 2024-08-12 DIAGNOSIS — Z12.4 CERVICAL CANCER SCREENING: ICD-10-CM

## 2024-08-12 DIAGNOSIS — Z01.419 ENCNTR FOR GYN EXAM (GENERAL) (ROUTINE) W/O ABN FINDINGS: Primary | ICD-10-CM

## 2024-08-12 PROCEDURE — S0612 ANNUAL GYNECOLOGICAL EXAMINA: HCPCS | Performed by: OBSTETRICS & GYNECOLOGY

## 2024-08-12 PROCEDURE — G0145 SCR C/V CYTO,THINLAYER,RESCR: HCPCS | Performed by: OBSTETRICS & GYNECOLOGY

## 2024-08-12 PROCEDURE — G0476 HPV COMBO ASSAY CA SCREEN: HCPCS | Performed by: OBSTETRICS & GYNECOLOGY

## 2024-08-12 NOTE — PROGRESS NOTES
Assessment        Diagnoses and all orders for this visit:    Encntr for gyn exam (general) (routine) w/o abn findings    Cervical cancer screening  -     Liquid-based pap, screening             Plan      All questions answered.  Await pap smear results.  Contraception: condoms.  Diagnosis explained in detail, including differential.  Discussed healthy lifestyle modifications.  Educational material distributed.  Follow up in 1 year.  Follow up as needed.     H/o GDMA! And FGR, can offfer MFM preconception counseling - declined for now and will call if needed      Subjective      Melisa Clark is a 33 y.o. female who presents for annual exam.      Chief Complaint   Patient presents with    Gynecologic Exam     , same partner since age 20  She has no problems today  16 months pp, breastfeed until 13 months    Last Pap: 10/22/21      HPV vaccine completed:no  Current contraception: condoms  History of abnormal Pap smear: no  History of abnormal mammogram: no  Family history of uterine or ovarian cancer: no  Family history of breast cancer: no  Family history of colon cancer: MGF 70s    OB History    Para Term  AB Living   1 1 1 0 0 1   SAB IAB Ectopic Multiple Live Births   0 0 0 0 1      # Outcome Date GA Lbr Paulie/2nd Weight Sex Type Anes PTL Lv   1 Term 23 38w1d / 00:07 2225 g (4 lb 14.5 oz) F Vag-Spont EPI N CHIKA      Name: TOMMIE,BABY GIRL (MELISA)      Apgar1: 8  Apgar5: 9       Menstrual History:  OB History          1    Para   1    Term   1       0    AB   0    Living   1         SAB   0    IAB   0    Ectopic   0    Multiple   0    Live Births   1                Menarche age: 13  Patient's last menstrual period was 07/15/2024.             No past medical history on file.  Past Surgical History:   Procedure Laterality Date    NO PAST SURGERIES       Family History   Problem Relation Age of Onset    Thyroid disease Mother     Hypertension Mother     Cancer Mother          "Thyroid    Diabetes Father     Diabetes Paternal Grandmother     Cancer Maternal Grandfather         Colon    Cancer Maternal Grandmother         Melanoma       Social History     Tobacco Use    Smoking status: Never    Smokeless tobacco: Never   Vaping Use    Vaping status: Never Used   Substance Use Topics    Alcohol use: Not Currently     Comment: Rarely 1 drink every couple of months    Drug use: Never        No current outpatient medications on file.    No Known Allergies        Review of Systems   Constitutional: Negative.    HENT: Negative.     Eyes: Negative.    Respiratory: Negative.     Cardiovascular: Negative.    Gastrointestinal: Negative.    Endocrine: Negative.    Genitourinary:         As noted in HPI   Musculoskeletal: Negative.    Skin: Negative.    Allergic/Immunologic: Negative.    Neurological: Negative.    Hematological: Negative.    Psychiatric/Behavioral: Negative.         /62   Pulse 78   Ht 5' 2\" (1.575 m)   Wt 62.7 kg (138 lb 3.2 oz)   LMP 07/15/2024   BMI 25.28 kg/m²         Physical Exam  Constitutional:       Appearance: She is well-developed.   Genitourinary:      Vulva, bladder and rectum normal.      No lesions in the vagina.      Genitourinary Comments:         Right Labia: No rash, tenderness, lesions, skin changes or Bartholin's cyst.     Left Labia: No tenderness, lesions, skin changes, Bartholin's cyst or rash.     No inguinal adenopathy present in the right or left side.     No vaginal discharge, tenderness or bleeding.      No vaginal prolapse present.     No vaginal atrophy present.       Right Adnexa: not tender, not full and no mass present.     Left Adnexa: not tender, not full and no mass present.     No cervical motion tenderness, friability, lesion or polyp.      Uterus is not enlarged or tender.      Pelvic exam was performed with patient in the lithotomy position.   Rectum:      No external hemorrhoid.   Breasts:     Right: No mass, nipple discharge, skin " change or tenderness.      Left: No mass, nipple discharge, skin change or tenderness.   HENT:      Head: Normocephalic.      Nose: Nose normal.   Eyes:      Conjunctiva/sclera: Conjunctivae normal.   Neck:      Thyroid: No thyromegaly.   Cardiovascular:      Rate and Rhythm: Normal rate and regular rhythm.      Heart sounds: Normal heart sounds. No murmur heard.  Pulmonary:      Effort: Pulmonary effort is normal. No respiratory distress.      Breath sounds: Normal breath sounds. No wheezing or rales.   Abdominal:      General: There is no distension.      Palpations: Abdomen is soft. There is no mass.      Tenderness: There is no abdominal tenderness. There is no guarding or rebound.   Musculoskeletal:         General: No tenderness.      Cervical back: Neck supple. No muscular tenderness.   Lymphadenopathy:      Cervical: No cervical adenopathy.      Lower Body: No right inguinal adenopathy. No left inguinal adenopathy.   Neurological:      Mental Status: She is alert and oriented to person, place, and time.   Skin:     General: Skin is warm and dry.   Psychiatric:         Mood and Affect: Mood normal.         Behavior: Behavior normal.   Vitals and nursing note reviewed. Exam conducted with a chaperone present.             No future appointments.

## 2024-08-13 LAB
HPV HR 12 DNA CVX QL NAA+PROBE: NEGATIVE
HPV16 DNA CVX QL NAA+PROBE: NEGATIVE
HPV18 DNA CVX QL NAA+PROBE: NEGATIVE

## 2024-08-21 LAB
LAB AP GYN PRIMARY INTERPRETATION: NORMAL
Lab: NORMAL

## 2024-09-06 ENCOUNTER — APPOINTMENT (OUTPATIENT)
Dept: LAB | Facility: MEDICAL CENTER | Age: 34
End: 2024-09-06
Payer: COMMERCIAL

## 2024-09-06 DIAGNOSIS — Z00.8 HEALTH EXAMINATION IN POPULATION SURVEY: ICD-10-CM

## 2024-09-06 LAB
CHOLEST SERPL-MCNC: 166 MG/DL
EST. AVERAGE GLUCOSE BLD GHB EST-MCNC: 103 MG/DL
HBA1C MFR BLD: 5.2 %
HDLC SERPL-MCNC: 56 MG/DL
LDLC SERPL CALC-MCNC: 93 MG/DL (ref 0–100)
NONHDLC SERPL-MCNC: 110 MG/DL
TRIGL SERPL-MCNC: 85 MG/DL

## 2024-09-06 PROCEDURE — 36415 COLL VENOUS BLD VENIPUNCTURE: CPT

## 2024-09-06 PROCEDURE — 80061 LIPID PANEL: CPT

## 2024-09-06 PROCEDURE — 83036 HEMOGLOBIN GLYCOSYLATED A1C: CPT

## 2025-08-15 ENCOUNTER — ANNUAL EXAM (OUTPATIENT)
Dept: OBGYN CLINIC | Facility: CLINIC | Age: 35
End: 2025-08-15
Payer: COMMERCIAL